# Patient Record
Sex: FEMALE | Race: WHITE | NOT HISPANIC OR LATINO | URBAN - METROPOLITAN AREA
[De-identification: names, ages, dates, MRNs, and addresses within clinical notes are randomized per-mention and may not be internally consistent; named-entity substitution may affect disease eponyms.]

---

## 2017-05-19 ENCOUNTER — CONVERSION ENCOUNTER (OUTPATIENT)
Dept: RHEUMATOLOGY | Facility: CLINIC | Age: 62
End: 2017-05-19

## 2017-05-19 LAB
ALBUMIN SERPL-MCNC: 4 G/DL (ref 3.6–5.1)
ALBUMIN/GLOB SERPL: ABNORMAL {RATIO} (ref 1–2.5)
ALP SERPL-CCNC: 51 UNITS/L (ref 33–130)
ALT SERPL-CCNC: 20 UNITS/L (ref 6–29)
AST SERPL-CCNC: 22 UNITS/L (ref 10–35)
BASOPHILS # BLD AUTO: ABNORMAL 10*3/MM3 (ref 0–200)
BASOPHILS NFR BLD AUTO: 1.5 %
BILIRUB SERPL-MCNC: 0.5 MG/DL (ref 0.2–1.2)
BUN SERPL-MCNC: 19 MG/DL (ref 7–25)
BUN/CREAT SERPL: ABNORMAL (ref 6–22)
CALCIUM SERPL-MCNC: 10.2 MG/DL (ref 8.6–10.4)
CHLORIDE SERPL-SCNC: 98 MMOL/L (ref 98–110)
CO2 CONTENT VENOUS: 28 MMOL/L (ref 20–31)
CONV NEUTROPHILS/100 LEUKOCYTES IN BODY FLUID BY MANUAL COUNT: 43.5 %
CONV TOTAL PROTEIN: 6.5 G/DL (ref 6.1–8.1)
CREAT UR-MCNC: 1.4 MG/DL (ref 0.5–0.99)
CRP SERPL-MCNC: ABNORMAL MG/DL
EOSINOPHIL # BLD AUTO: 6.1 %
EOSINOPHIL # BLD AUTO: ABNORMAL 10*3/MM3 (ref 15–500)
ERYTHROCYTE [DISTWIDTH] IN BLOOD BY AUTOMATED COUNT: 11.7 % (ref 11–15)
ERYTHROCYTE [SEDIMENTATION RATE] IN BLOOD BY WESTERGREN METHOD: 6 MM/HR
GLOBULIN UR ELPH-MCNC: ABNORMAL G/DL (ref 1.9–3.7)
GLUCOSE SERPL-MCNC: 219 MG/DL (ref 65–99)
HCT VFR BLD AUTO: 39.3 % (ref 35–45)
HGB BLD-MCNC: 13 G/DL (ref 11.7–15.5)
LYMPHOCYTES # BLD AUTO: ABNORMAL 10*3/MM3 (ref 850–3900)
LYMPHOCYTES NFR BLD AUTO: 37.7 %
MCH RBC QN AUTO: 30.4 PG (ref 27–33)
MCHC RBC AUTO-ENTMCNC: ABNORMAL % (ref 32–36)
MCV RBC AUTO: 91.8 FL (ref 80–100)
MONOCYTES # BLD AUTO: ABNORMAL 10*3/MICROLITER (ref 200–950)
MONOCYTES NFR BLD AUTO: 11.2 %
NEUTROPHILS # BLD AUTO: ABNORMAL 10*3/MM3 (ref 1500–7800)
PLATELET # BLD AUTO: ABNORMAL 10*3/MM3 (ref 140–400)
PMV BLD AUTO: 8.6 FL (ref 7.5–12.5)
POTASSIUM SERPL-SCNC: 4.4 MMOL/L (ref 3.5–5.3)
RBC # BLD AUTO: ABNORMAL 10*6/MM3 (ref 3.8–5.1)
SODIUM SERPL-SCNC: 134 MMOL/L (ref 135–146)
WBC # BLD AUTO: ABNORMAL K/UL (ref 3.8–10.8)

## 2017-06-28 ENCOUNTER — HOSPITAL ENCOUNTER (OUTPATIENT)
Dept: MAMMOGRAPHY | Facility: HOSPITAL | Age: 62
Discharge: HOME | End: 2017-06-28
Attending: NURSE PRACTITIONER | Admitting: NURSE PRACTITIONER

## 2017-08-09 ENCOUNTER — HOSPITAL ENCOUNTER (OUTPATIENT)
Dept: OTHER | Facility: HOSPITAL | Age: 62
Discharge: HOME | End: 2017-08-09
Attending: NURSE PRACTITIONER | Admitting: NURSE PRACTITIONER

## 2017-08-09 LAB
ALBUMIN SERPL-MCNC: 3.8 G/DL (ref 3.5–4.8)
ALBUMIN/GLOB SERPL: 1.5 {RATIO} (ref 1–1.7)
ALP SERPL-CCNC: 49 IU/L (ref 32–91)
ALT SERPL-CCNC: 28 IU/L (ref 14–54)
ANION GAP SERPL CALC-SCNC: 13.4 MMOL/L (ref 10–20)
AST SERPL-CCNC: 29 IU/L (ref 15–41)
BASOPHILS # BLD AUTO: 0 10*3/UL (ref 0–0.2)
BASOPHILS NFR BLD AUTO: 1 % (ref 0–2)
BILIRUB SERPL-MCNC: 0.7 MG/DL (ref 0.3–1.2)
BUN SERPL-MCNC: 19 MG/DL (ref 8–20)
BUN/CREAT SERPL: 14.6 (ref 5.4–26.2)
CALCIUM SERPL-MCNC: 9.9 MG/DL (ref 8.9–10.3)
CHLORIDE SERPL-SCNC: 95 MMOL/L (ref 101–111)
CONV ABS BANDS: 0.1 10*3/UL
CONV CO2: 25 MMOL/L (ref 22–32)
CONV TOTAL PROTEIN: 6.4 G/DL (ref 6.1–7.9)
CREAT UR-MCNC: 1.3 MG/DL (ref 0.4–1)
CRP SERPL-MCNC: 0.05 MG/DL (ref 0–0.7)
DIFFERENTIAL METHOD BLD: (no result)
EOSINOPHIL # BLD AUTO: 0.1 10*3/UL (ref 0–0.3)
EOSINOPHIL # BLD AUTO: 4 % (ref 0–3)
ERYTHROCYTE [DISTWIDTH] IN BLOOD BY AUTOMATED COUNT: 12.4 % (ref 11.5–14.5)
ERYTHROCYTE [SEDIMENTATION RATE] IN BLOOD BY WESTERGREN METHOD: 16 MM/HR (ref 0–30)
GLOBULIN UR ELPH-MCNC: 2.6 G/DL (ref 2.5–3.8)
GLUCOSE SERPL-MCNC: 244 MG/DL (ref 65–99)
HCT VFR BLD AUTO: 38.3 % (ref 35–49)
HGB BLD-MCNC: 13 G/DL (ref 12–15)
LYMPHOCYTES # BLD AUTO: 1.8 10*3/UL (ref 0.8–4.8)
LYMPHOCYTES NFR BLD AUTO: 45 % (ref 18–42)
MCH RBC QN AUTO: 30.8 PG (ref 26–32)
MCHC RBC AUTO-ENTMCNC: 33.9 G/DL (ref 32–36)
MCV RBC AUTO: 91.1 FL (ref 80–94)
MONOCYTES # BLD AUTO: 0.1 10*3/UL (ref 0.1–1.3)
MONOCYTES NFR BLD AUTO: 3 % (ref 2–11)
NEUTROPHILS # BLD AUTO: 1.5 10*3/UL (ref 2.3–8.6)
NEUTROPHILS NFR BLD AUTO: 43 % (ref 50–75)
NEUTS BAND NFR BLD MANUAL: 4 % (ref 0–5)
PLATELET # BLD AUTO: 242 10*3/UL (ref 150–450)
PMV BLD AUTO: 7 FL (ref 7.4–10.4)
POTASSIUM SERPL-SCNC: 4.4 MMOL/L (ref 3.6–5.1)
RBC # BLD AUTO: 4.21 10*6/UL (ref 4–5.4)
SODIUM SERPL-SCNC: 129 MMOL/L (ref 136–144)
WBC # BLD AUTO: 3.6 10*3/UL (ref 4.5–11.5)

## 2017-08-16 ENCOUNTER — HOSPITAL ENCOUNTER (OUTPATIENT)
Dept: MAMMOGRAPHY | Facility: HOSPITAL | Age: 62
Discharge: HOME | End: 2017-08-16
Attending: NURSE PRACTITIONER | Admitting: NURSE PRACTITIONER

## 2017-08-18 LAB
ALBUMIN SERPL-MCNC: 3.9 G/DL (ref 3.6–5.1)
ALBUMIN/GLOB SERPL: ABNORMAL {RATIO} (ref 1–2.5)
ALP SERPL-CCNC: 59 UNITS/L (ref 33–130)
ALT SERPL-CCNC: 21 UNITS/L (ref 6–29)
AST SERPL-CCNC: 19 UNITS/L (ref 10–35)
BASOPHILS # BLD AUTO: ABNORMAL 10*3/MM3 (ref 0–200)
BASOPHILS NFR BLD AUTO: 1.9 %
BILIRUB SERPL-MCNC: 0.4 MG/DL (ref 0.2–1.2)
BUN SERPL-MCNC: 27 MG/DL (ref 7–25)
BUN/CREAT SERPL: ABNORMAL (ref 6–22)
C3 SERPL-MCNC: 82 MG/DL (ref 90–180)
C4 SERPL-MCNC: 22 MG/DL (ref 16–47)
CALCIUM SERPL-MCNC: 10 MG/DL (ref 8.6–10.4)
CHLORIDE SERPL-SCNC: 97 MMOL/L (ref 98–110)
CO2 CONTENT VENOUS: 25 MMOL/L (ref 20–31)
CONV NEUTROPHILS/100 LEUKOCYTES IN BODY FLUID BY MANUAL COUNT: 36.9 %
CONV TOTAL PROTEIN: 6.5 G/DL (ref 6.1–8.1)
CREAT UR-MCNC: 1.49 MG/DL (ref 0.5–0.99)
CRP SERPL-MCNC: ABNORMAL MG/DL
EOSINOPHIL # BLD AUTO: 5.6 %
EOSINOPHIL # BLD AUTO: ABNORMAL 10*3/MM3 (ref 15–500)
ERYTHROCYTE [DISTWIDTH] IN BLOOD BY AUTOMATED COUNT: 11.7 % (ref 11–15)
GLOBULIN UR ELPH-MCNC: ABNORMAL G/DL (ref 1.9–3.7)
GLUCOSE SERPL-MCNC: 349 MG/DL (ref 65–99)
HCT VFR BLD AUTO: 37.4 % (ref 35–45)
HGB BLD-MCNC: 12.7 G/DL (ref 11.7–15.5)
LYMPHOCYTES # BLD AUTO: ABNORMAL 10*3/MM3 (ref 850–3900)
LYMPHOCYTES NFR BLD AUTO: 41 %
MCH RBC QN AUTO: 31.3 PG (ref 27–33)
MCHC RBC AUTO-ENTMCNC: ABNORMAL % (ref 32–36)
MCV RBC AUTO: 92.1 FL (ref 80–100)
MONOCYTES # BLD AUTO: ABNORMAL 10*3/MICROLITER (ref 200–950)
MONOCYTES NFR BLD AUTO: 14.6 %
NEUTROPHILS # BLD AUTO: ABNORMAL 10*3/MM3 (ref 1500–7800)
PLATELET # BLD AUTO: ABNORMAL 10*3/MM3 (ref 140–400)
PMV BLD AUTO: 8.9 FL (ref 7.5–12.5)
POTASSIUM SERPL-SCNC: 4.9 MMOL/L (ref 3.5–5.3)
RBC # BLD AUTO: ABNORMAL 10*6/MM3 (ref 3.8–5.1)
SODIUM SERPL-SCNC: 132 MMOL/L (ref 135–146)
WBC # BLD AUTO: ABNORMAL K/UL (ref 3.8–10.8)

## 2017-12-09 LAB
ALBUMIN SERPL-MCNC: 3.9 G/DL (ref 3.6–5.1)
ALBUMIN/GLOB SERPL: ABNORMAL {RATIO} (ref 1–2.5)
ALP SERPL-CCNC: 63 UNITS/L (ref 33–130)
ALT SERPL-CCNC: 30 UNITS/L (ref 6–29)
AST SERPL-CCNC: 29 UNITS/L (ref 10–35)
BILIRUB SERPL-MCNC: 0.4 MG/DL (ref 0.2–1.2)
BILIRUB UR QL STRIP: NEGATIVE
BUN SERPL-MCNC: 21 MG/DL (ref 7–25)
BUN/CREAT SERPL: ABNORMAL (ref 6–22)
C3 SERPL-MCNC: 100 MG/DL (ref 90–180)
C4 SERPL-MCNC: 27 MG/DL (ref 16–47)
CALCIUM SERPL-MCNC: 9.9 MG/DL (ref 8.6–10.4)
CHLORIDE SERPL-SCNC: 99 MMOL/L (ref 98–110)
CO2 CONTENT VENOUS: 28 MMOL/L (ref 20–31)
COLOR UR: YELLOW
CONV BACTERIA IN URINE MICRO: ABNORMAL /HPF
CONV HYALINE CASTS IN URINE MICRO: ABNORMAL
CONV PROTEIN IN URINE BY AUTOMATED TEST STRIP: ABNORMAL
CONV TOTAL PROTEIN: 6.7 G/DL (ref 6.1–8.1)
CREAT UR-MCNC: 1.43 MG/DL (ref 0.5–0.99)
ERYTHROCYTE [DISTWIDTH] IN BLOOD BY AUTOMATED COUNT: 11.8 % (ref 11–15)
ERYTHROCYTE [SEDIMENTATION RATE] IN BLOOD BY WESTERGREN METHOD: 6 MM/HR
GLOBULIN UR ELPH-MCNC: ABNORMAL G/DL (ref 1.9–3.7)
GLUCOSE SERPL-MCNC: 216 MG/DL (ref 65–99)
GLUCOSE UR QL: ABNORMAL G/DL
HCT VFR BLD AUTO: 38.6 % (ref 35–45)
HGB BLD-MCNC: 13 G/DL (ref 11.7–15.5)
HGB UR QL STRIP: NEGATIVE
KETONES UR QL STRIP: NEGATIVE
LEUKOCYTE ESTERASE UR QL STRIP: NEGATIVE
MCH RBC QN AUTO: 30.4 PG (ref 27–33)
MCHC RBC AUTO-ENTMCNC: ABNORMAL % (ref 32–36)
MCV RBC AUTO: 90.4 FL (ref 80–100)
NITRITE UR QL STRIP: NEGATIVE
PH UR STRIP.AUTO: 5.5 [PH] (ref 5–8)
PLATELET # BLD AUTO: ABNORMAL 10*3/MM3 (ref 140–400)
PMV BLD AUTO: 9.7 FL (ref 7.5–12.5)
POTASSIUM SERPL-SCNC: 4.5 MMOL/L (ref 3.5–5.3)
RBC # BLD AUTO: ABNORMAL 10*6/MM3 (ref 3.8–5.1)
RBC #/AREA URNS HPF: ABNORMAL /[HPF]
SODIUM SERPL-SCNC: 133 MMOL/L (ref 135–146)
SP GR UR: 1.01 (ref 1–1.03)
SQUAMOUS #/AREA URNS HPF: ABNORMAL /HPF
TSH SERPL-ACNC: 0.77 MICROINTL UNITS/ML (ref 0.4–4.5)
WBC # BLD AUTO: ABNORMAL K/UL (ref 3.8–10.8)
WBC #/AREA URNS HPF: ABNORMAL CELLS/HPF

## 2020-12-22 ENCOUNTER — OFFICE VISIT (OUTPATIENT)
Dept: ENDOCRINOLOGY | Facility: CLINIC | Age: 65
End: 2020-12-22

## 2020-12-22 ENCOUNTER — TELEPHONE (OUTPATIENT)
Dept: ENDOCRINOLOGY | Facility: CLINIC | Age: 65
End: 2020-12-22

## 2020-12-22 VITALS
SYSTOLIC BLOOD PRESSURE: 170 MMHG | OXYGEN SATURATION: 97 % | DIASTOLIC BLOOD PRESSURE: 80 MMHG | HEART RATE: 76 BPM | TEMPERATURE: 97.7 F | BODY MASS INDEX: 28.52 KG/M2 | HEIGHT: 62 IN | WEIGHT: 155 LBS

## 2020-12-22 DIAGNOSIS — E78.2 MIXED HYPERLIPIDEMIA: ICD-10-CM

## 2020-12-22 DIAGNOSIS — I10 ESSENTIAL HYPERTENSION: ICD-10-CM

## 2020-12-22 DIAGNOSIS — E10.65 TYPE 1 DIABETES MELLITUS WITH HYPERGLYCEMIA (HCC): Primary | ICD-10-CM

## 2020-12-22 PROBLEM — M54.50 LOWER BACK PAIN: Status: ACTIVE | Noted: 2017-12-07

## 2020-12-22 PROBLEM — M25.551 PAIN OF RIGHT HIP JOINT: Status: ACTIVE | Noted: 2017-02-13

## 2020-12-22 PROBLEM — N18.2 STAGE 2 CHRONIC KIDNEY DISEASE: Status: ACTIVE | Noted: 2017-02-13

## 2020-12-22 PROBLEM — M85.80 OSTEOPENIA: Status: ACTIVE | Noted: 2017-08-16

## 2020-12-22 LAB — GLUCOSE BLDC GLUCOMTR-MCNC: 194 MG/DL (ref 70–105)

## 2020-12-22 PROCEDURE — 82962 GLUCOSE BLOOD TEST: CPT | Performed by: INTERNAL MEDICINE

## 2020-12-22 PROCEDURE — 99204 OFFICE O/P NEW MOD 45 MIN: CPT | Performed by: INTERNAL MEDICINE

## 2020-12-22 RX ORDER — INSULIN DETEMIR 100 [IU]/ML
26 INJECTION, SOLUTION SUBCUTANEOUS DAILY
COMMUNITY
Start: 2015-08-21 | End: 2020-12-22

## 2020-12-22 RX ORDER — MAG HYDROX/ALUMINUM HYD/SIMETH 400-400-40
SUSPENSION, ORAL (FINAL DOSE FORM) ORAL
COMMUNITY
Start: 2015-11-18 | End: 2022-04-28

## 2020-12-22 RX ORDER — LEVOTHYROXINE SODIUM 0.15 MG/1
TABLET ORAL DAILY
COMMUNITY
Start: 2015-08-21 | End: 2021-10-26 | Stop reason: SDUPTHER

## 2020-12-22 RX ORDER — OMEPRAZOLE 40 MG/1
40 CAPSULE, DELAYED RELEASE ORAL DAILY
COMMUNITY
Start: 2016-08-18 | End: 2021-10-26

## 2020-12-22 RX ORDER — AMLODIPINE BESYLATE 5 MG/1
TABLET ORAL
COMMUNITY
Start: 2020-10-25 | End: 2022-04-28 | Stop reason: SDUPTHER

## 2020-12-22 RX ORDER — INSULIN LISPRO 100 [IU]/ML
INJECTION, SOLUTION INTRAVENOUS; SUBCUTANEOUS
Qty: 5 PEN | Refills: 6 | Status: SHIPPED | OUTPATIENT
Start: 2020-12-22 | End: 2021-12-30

## 2020-12-22 RX ORDER — CHOLECALCIFEROL (VITAMIN D3) 25 MCG
2000 CAPSULE ORAL 2 TIMES DAILY
COMMUNITY
Start: 2015-11-18 | End: 2022-08-22 | Stop reason: SDUPTHER

## 2020-12-22 RX ORDER — LISINOPRIL 10 MG/1
TABLET ORAL
COMMUNITY
Start: 2015-08-21 | End: 2020-12-22

## 2020-12-22 RX ORDER — GABAPENTIN 100 MG/1
1 CAPSULE ORAL EVERY 8 HOURS
COMMUNITY
Start: 2017-12-07 | End: 2020-12-22

## 2020-12-22 RX ORDER — PRENATAL VIT NO.126/IRON/FOLIC 28MG-0.8MG
2 TABLET ORAL DAILY
COMMUNITY

## 2020-12-22 RX ORDER — ONDANSETRON 4 MG/1
4 TABLET, FILM COATED ORAL EVERY 8 HOURS PRN
COMMUNITY

## 2020-12-22 RX ORDER — LOSARTAN POTASSIUM 25 MG/1
12.5 TABLET ORAL DAILY
COMMUNITY
Start: 2020-07-29 | End: 2020-12-22

## 2020-12-22 RX ORDER — RANITIDINE 150 MG/1
TABLET ORAL
COMMUNITY
Start: 2015-11-18 | End: 2020-12-22

## 2020-12-22 RX ORDER — LANOLIN ALCOHOL/MO/W.PET/CERES
50 CREAM (GRAM) TOPICAL DAILY
COMMUNITY
End: 2022-08-22

## 2020-12-22 RX ORDER — CALCITRIOL 0.25 UG/1
CAPSULE, LIQUID FILLED ORAL
COMMUNITY
Start: 2016-04-13 | End: 2022-09-13

## 2020-12-22 RX ORDER — ASPIRIN 81 MG/1
81 TABLET ORAL DAILY
COMMUNITY

## 2020-12-22 RX ORDER — THIAMINE HCL 50 MG
50 TABLET ORAL DAILY
COMMUNITY
End: 2022-04-28

## 2020-12-22 RX ORDER — HYDROXYCHLOROQUINE SULFATE 200 MG/1
200 TABLET, FILM COATED ORAL 2 TIMES DAILY
COMMUNITY
Start: 2018-01-04

## 2020-12-22 RX ORDER — INSULIN DETEMIR 100 [IU]/ML
INJECTION, SOLUTION SUBCUTANEOUS
Qty: 5 PEN | Refills: 6 | Status: SHIPPED | OUTPATIENT
Start: 2020-12-22 | End: 2021-06-22

## 2020-12-22 RX ORDER — LANCETS
EACH MISCELLANEOUS
COMMUNITY
Start: 2016-04-13

## 2020-12-22 RX ORDER — KETOROLAC TROMETHAMINE 4 MG/ML
SOLUTION/ DROPS OPHTHALMIC
COMMUNITY
End: 2020-12-22

## 2020-12-22 RX ORDER — CIPROFLOXACIN 500 MG/1
TABLET, FILM COATED ORAL EVERY 12 HOURS
COMMUNITY
Start: 2017-09-28 | End: 2020-12-22

## 2020-12-22 RX ORDER — LEFLUNOMIDE 10 MG/1
TABLET ORAL
COMMUNITY
Start: 2016-08-18 | End: 2020-12-22

## 2020-12-22 RX ORDER — INSULIN LISPRO 100 [IU]/ML
2 INJECTION, SOLUTION INTRAVENOUS; SUBCUTANEOUS
COMMUNITY
Start: 2015-08-21 | End: 2020-12-22

## 2020-12-22 RX ORDER — ATORVASTATIN CALCIUM 40 MG/1
40 TABLET, FILM COATED ORAL DAILY
COMMUNITY
Start: 2017-07-19 | End: 2021-10-26

## 2020-12-22 RX ORDER — BUMETANIDE 1 MG/1
1 TABLET ORAL 2 TIMES DAILY
COMMUNITY
End: 2022-11-03

## 2020-12-22 RX ORDER — BLOOD-GLUCOSE METER
EACH MISCELLANEOUS
COMMUNITY
Start: 2016-04-13

## 2020-12-22 RX ORDER — SENNOSIDES 8.6 MG
650 CAPSULE ORAL DAILY PRN
COMMUNITY

## 2020-12-22 RX ORDER — CETIRIZINE HYDROCHLORIDE 10 MG/1
TABLET ORAL
COMMUNITY
Start: 2015-11-18 | End: 2022-08-22 | Stop reason: SDUPTHER

## 2020-12-22 RX ORDER — VIT C/B6/B5/MAGNESIUM/HERB 173 50-5-6-5MG
1 CAPSULE ORAL DAILY
COMMUNITY

## 2020-12-22 RX ORDER — PYRIDOXINE HCL (VITAMIN B6) 100 MG
1 TABLET ORAL DAILY
COMMUNITY
Start: 2017-06-27

## 2020-12-22 RX ORDER — TRAMADOL HYDROCHLORIDE 50 MG/1
TABLET ORAL
COMMUNITY
Start: 2015-08-21 | End: 2020-12-22

## 2020-12-22 NOTE — PROGRESS NOTES
Endocrine Consult Outpatient  Self-referred  Patient Care Team:  Mary Anne Salazar APRN as PCP - General     Chief Complaint: Uncontrolled type 1 diabetes    HPI: 65-year-old female with diagnosis of type 1 diabetes, hypertension, hyperlipidemia and hypothyroidism is here for evaluation.  For type 1 diabetes: She is currently on Levemir 26 units daily in the morning and Humalog sliding scale.  She used to be on insulin pump however is not using it and would like to go back on insulin pump.  She did bring in blood sugar records for review and they are running between .  Hypertension: On amlodipine, she developed cough with lisinopril.  Hyperlipidemia: Continues to atorvastatin  Hypothyroidism: On replacement with levothyroxine.    Old records reviewed: Labs from December 18, 2020 showed a urine microalbumin creatinine ratio was 91, C-peptide was less than 1.1 and glucose of 147.    Past Medical History:   Diagnosis Date   • Arthritis    • Diabetes mellitus type I (CMS/[facility])    • Hyperlipidemia    • Hypertension    • Hyperthyroidism    • Kidney disease    • Neuropathy        Social History     Socioeconomic History   • Marital status: Single     Spouse name: Not on file   • Number of children: Not on file   • Years of education: Not on file   • Highest education level: Not on file   Tobacco Use   • Smoking status: Former Smoker   • Smokeless tobacco: Never Used   Substance and Sexual Activity   • Alcohol use: Never     Frequency: Never   • Drug use: Defer   • Sexual activity: Defer       Family History   Problem Relation Age of Onset   • Diabetes Father    • Diabetes Maternal Grandmother        Allergies   Allergen Reactions   • Azithromycin GI Intolerance   • Erythromycin GI Intolerance       ROS:   Constitutional:  Admit fatigue, tiredness.    Eyes:  Denies change in visual acuity   HENT:  Denies nasal congestion or sore throat   Respiratory: denies cough, shortness of breath.   Cardiovascular:  denies  chest pain, edema   GI:  Denies abdominal pain, nausea, vomiting.    :  Denies dysuria   Musculoskeletal:  Denies back pain or joint pain   Integument:  Denies dry skin, rash   Neurologic:  Denies headache, focal weakness or sensory changes   Endocrine:  Denies polyuria or polydipsia   Psychiatric:  Denies depression or anxiety      Vitals:    12/22/20 1351   BP: 170/80   Pulse: 76   Temp: 97.7 °F (36.5 °C)   SpO2: 97%        Physical Exam:  GEN: NAD, conversant  EYES: EOMI, PERRL, no conjunctival erythema  NECK: no thyromegaly, full ROM   CV: RRR, no murmurs/rubs/gallops, no peripheral edema  LUNG: CTAB, no wheezes/rales/ronchi  SKIN: no rashes, no acanthosis  MSK: Has BL LE edema, full ROM of all extremities  NEURO: no tremors, DTR normal  PSYCH: AOX3, appropriate mood, affect normal      Results Review:     I reviewed the patient's new clinical results.    Lab Results   Component Value Date    GLUCOSE 216 (H) 12/09/2017    BUN 21 12/09/2017    CREATININE 1.43 (H) 12/09/2017    EGFRIFNONA 45 (L) 12/09/2017    EGFRIFAFRI 39 (L) 12/09/2017    BCR 15 (calc) 12/09/2017    K 4.5 12/09/2017    CO2 25 08/09/2017    CALCIUM 9.9 12/09/2017    ALBUMIN 3.9 12/09/2017    LABIL2 1.4 (calc) 12/09/2017    AST 29 12/09/2017    ALT 30 (H) 12/09/2017     No results found for: HGBA1C  Lab Results   Component Value Date    CREATININE 1.43 (H) 12/09/2017     Lab Results   Component Value Date    TSH 0.77 12/09/2017       Medication Review: Reviewed.       Current Outpatient Medications:   •  acetaminophen (TYLENOL) 650 MG 8 hr tablet, Take 650 mg by mouth., Disp: , Rfl:   •  amLODIPine (NORVASC) 5 MG tablet, TAKE ONE TABLET BY MOUTH DAILY, Disp: , Rfl:   •  aspirin (aspirin) 81 MG EC tablet, Take 81 mg by mouth Daily., Disp: , Rfl:   •  atorvastatin (LIPITOR) 40 MG tablet, Take 40 mg by mouth Daily., Disp: , Rfl:   •  B Complex-Biotin-FA (B Complete) tablet, B COMPLETE TABS, Disp: , Rfl:   •  Blood Glucose Monitoring Suppl (ONE  TOUCH ULTRA 2) w/Device kit, ONETOUCH ULTRA 2 w/Device KIT, Disp: , Rfl:   •  bumetanide (Bumex) 0.5 MG tablet, Take 0.5 mg by mouth Daily., Disp: , Rfl:   •  calcitriol (ROCALTROL) 0.25 MCG capsule, CALCITRIOL 0.25 MCG CAPS, Disp: , Rfl:   •  cetirizine (ZyrTEC Allergy) 10 MG tablet, ZYRTEC ALLERGY 10 MG TABS, Disp: , Rfl:   •  Cholecalciferol (Vitamin D-3) 25 MCG (1000 UT) capsule, Take 2,000 Units by mouth 2 (two) times a day., Disp: , Rfl:   •  ELDERBERRY PO, Take  by mouth., Disp: , Rfl:   •  Ferrous Sulfate (IRON PO), IRON TABS, Disp: , Rfl:   •  glucose blood (ONE TOUCH ULTRA TEST) test strip, ONETOUCH ULTRA BLUE STRP, Disp: , Rfl:   •  hydroxychloroquine (PLAQUENIL) 200 MG tablet, Take 200 mg by mouth 2 (Two) Times a Day., Disp: , Rfl:   •  insulin detemir (Levemir) 100 UNIT/ML injection, Inject 26 Units under the skin into the appropriate area as directed Daily., Disp: , Rfl:   •  Insulin Lispro (HumaLOG) 100 UNIT/ML solution cartridge, 2 Units 3 (Three) Times a Day With Meals. 2 units three times a daily with meals also added Slinding scale, Disp: , Rfl:   •  Lancets (onetouch ultrasoft) lancets, ONETOUCH ULTRASOFT LANCETS, Disp: , Rfl:   •  levothyroxine (SYNTHROID, LEVOTHROID) 150 MCG tablet, Take  by mouth Daily., Disp: , Rfl:   •  Multiple Vitamins-Minerals (OCUVITE ADULT 50+ PO), Take  by mouth., Disp: , Rfl:   •  Omega-3 Krill Oil 300 MG capsule, OMEGA-3 KRILL  MG CAPS, Disp: , Rfl:   •  omeprazole (priLOSEC) 40 MG capsule, Take 40 mg by mouth Daily. Alternate days then 20 on the other days, Disp: , Rfl:   •  ondansetron (ZOFRAN) 4 MG tablet, Take 4 mg by mouth Every 8 (Eight) Hours As Needed for Nausea or Vomiting., Disp: , Rfl:   •  prenatal vitamin (prenatal, CLASSIC, vitamin) tablet, Take  by mouth Daily., Disp: , Rfl:   •  Thiamine HCl (vitamin B-1) 50 MG tablet, Take 50 mg by mouth Daily., Disp: , Rfl:   •  Turmeric 500 MG capsule, Take  by mouth., Disp: , Rfl:   •  vitamin B-6  (PYRIDOXINE) 50 MG tablet, Take 50 mg by mouth Daily., Disp: , Rfl:     Assessment/Plan   1.  Diabetes mellitus type 1: Uncontrolled with significant fluctuations in the blood sugars and low blood sugars, at this time I will ask her to reduce Levemir to 20 units subcu daily and change Humalog to 6 units with each meal along with a correction scale of 1 unit for each 50/150.  I will refer her for diabetes education for diabetes insulin pump along with a continuous glucose monitoring system.  In the meantime of advised her to always keep glucose source in case of low blood sugar and get annual eye exam and flu vaccine.     2.  Hypertension: Blood pressure is high today  It is very high today going on.  She does have chronic kidney disease, I will check CMP before adding any more medications.    3.  Hyperlipidemia: Will follow FLP.                   Jo Mahoney MD FACE.

## 2020-12-22 NOTE — PATIENT INSTRUCTIONS
Change Levemir to 20 units subcu daily  Change Humalog to 6 units with each meal along with a sliding scale of 1 unit for each 50 mg blood sugar over 150  Please get labs done fasting in the next few days  Arrange for diabetes education consultation for insulin pump and glucose monitoring system  Annual eye exam and flu vaccine

## 2021-01-12 ENCOUNTER — OFFICE VISIT (OUTPATIENT)
Dept: ENDOCRINOLOGY | Facility: CLINIC | Age: 66
End: 2021-01-12

## 2021-01-12 DIAGNOSIS — E10.65 TYPE 1 DIABETES MELLITUS WITH HYPERGLYCEMIA (HCC): ICD-10-CM

## 2021-01-12 PROCEDURE — G0108 DIAB MANAGE TRN  PER INDIV: HCPCS | Performed by: DIETITIAN, REGISTERED

## 2021-01-12 NOTE — PROGRESS NOTES
Spent 1 hour with pt. Pt was referred to diabetes education to talk about choosing a new insulin pump and CGM. Pt was unable to decide on pump and CGM at this time, so provided handouts for pt to review at home. Advised pt to call when she has made a decision. Pt also indicated interest in going through review DM education. Pt had attended DSME classes in the past, so signed pt up for refresher class 1 on 2/2/21.

## 2021-01-13 ENCOUNTER — TELEPHONE (OUTPATIENT)
Dept: ENDOCRINOLOGY | Facility: CLINIC | Age: 66
End: 2021-01-13

## 2021-01-13 NOTE — TELEPHONE ENCOUNTER
BGs scanned into phone note. Per MD s/o pt to take 10 units of levemir in the morning and 10 units at bedtime. Pt reported she is currently only taking 3 units of humalog with meals. Pt is to drop off BG's next week.

## 2021-01-21 ENCOUNTER — TELEPHONE (OUTPATIENT)
Dept: ENDOCRINOLOGY | Facility: CLINIC | Age: 66
End: 2021-01-21

## 2021-02-02 ENCOUNTER — TELEPHONE (OUTPATIENT)
Dept: ENDOCRINOLOGY | Facility: CLINIC | Age: 66
End: 2021-02-02

## 2021-02-02 ENCOUNTER — OFFICE VISIT (OUTPATIENT)
Dept: ENDOCRINOLOGY | Facility: CLINIC | Age: 66
End: 2021-02-02

## 2021-02-02 DIAGNOSIS — E10.65 TYPE 1 DIABETES MELLITUS WITH HYPERGLYCEMIA (HCC): ICD-10-CM

## 2021-02-02 PROCEDURE — G0109 DIAB MANAGE TRN IND/GROUP: HCPCS | Performed by: DIETITIAN, REGISTERED

## 2021-02-02 NOTE — TELEPHONE ENCOUNTER
Pt brought in recent BGs, scanned into phone note. Attempted to call pt, but had to leave  to call us back. Want to discuss increasing her humalog 1 unit. Pt was previously apprehensive about it d/t being afraid of going low.     Pt also still deciding on which insulin pump she would like to choose.

## 2021-02-03 NOTE — TELEPHONE ENCOUNTER
Per MD s/o, pt is to increase to 4 units of humalog with meals in addition to her SSI of 1 unit for every 50 mg over 150. Pt is to send in BGs next week.

## 2021-02-10 ENCOUNTER — TELEPHONE (OUTPATIENT)
Dept: ENDOCRINOLOGY | Facility: CLINIC | Age: 66
End: 2021-02-10

## 2021-02-10 NOTE — TELEPHONE ENCOUNTER
BGs scanned into phone note. Had to leave VM to call us back. Was going to ask pt if she knew what had caused her low BG in the past week, and tell her to subtract 1 unit of Humalog for a BG < 150.

## 2021-02-11 NOTE — TELEPHONE ENCOUNTER
Pt called back and does not remember about her low BG last week.  Gave pt George's recommendation to reduce her H ac by one unit if pre-meal BG is <150.  Pt verbalized an understanding.

## 2021-02-22 ENCOUNTER — OFFICE VISIT (OUTPATIENT)
Dept: ENDOCRINOLOGY | Facility: CLINIC | Age: 66
End: 2021-02-22

## 2021-02-22 DIAGNOSIS — E10.65 TYPE 1 DIABETES MELLITUS WITH HYPERGLYCEMIA (HCC): ICD-10-CM

## 2021-02-22 PROCEDURE — G0109 DIAB MANAGE TRN IND/GROUP: HCPCS | Performed by: DIETITIAN, REGISTERED

## 2021-04-20 ENCOUNTER — TELEPHONE (OUTPATIENT)
Dept: ENDOCRINOLOGY | Facility: CLINIC | Age: 66
End: 2021-04-20

## 2021-04-20 RX ORDER — LOSARTAN POTASSIUM 25 MG/1
25 TABLET ORAL DAILY
COMMUNITY
Start: 2021-02-19 | End: 2021-04-27 | Stop reason: SDUPTHER

## 2021-04-20 RX ORDER — OMEPRAZOLE 20 MG/1
20 CAPSULE, DELAYED RELEASE ORAL
COMMUNITY
End: 2021-10-26

## 2021-04-27 ENCOUNTER — OFFICE VISIT (OUTPATIENT)
Dept: ENDOCRINOLOGY | Facility: CLINIC | Age: 66
End: 2021-04-27

## 2021-04-27 VITALS
HEART RATE: 75 BPM | OXYGEN SATURATION: 98 % | WEIGHT: 152 LBS | BODY MASS INDEX: 27.97 KG/M2 | HEIGHT: 62 IN | TEMPERATURE: 97.8 F | SYSTOLIC BLOOD PRESSURE: 165 MMHG | DIASTOLIC BLOOD PRESSURE: 75 MMHG

## 2021-04-27 DIAGNOSIS — E10.21 TYPE 1 DIABETES MELLITUS WITH DIABETIC NEPHROPATHY (HCC): Primary | ICD-10-CM

## 2021-04-27 DIAGNOSIS — I10 ESSENTIAL HYPERTENSION: ICD-10-CM

## 2021-04-27 DIAGNOSIS — E03.9 ACQUIRED HYPOTHYROIDISM: ICD-10-CM

## 2021-04-27 DIAGNOSIS — N18.32 STAGE 3B CHRONIC KIDNEY DISEASE (HCC): ICD-10-CM

## 2021-04-27 DIAGNOSIS — E11.42 DIABETIC PERIPHERAL NEUROPATHY (HCC): ICD-10-CM

## 2021-04-27 DIAGNOSIS — E78.2 MIXED HYPERLIPIDEMIA: ICD-10-CM

## 2021-04-27 PROBLEM — M32.9 LUPUS: Status: ACTIVE | Noted: 2021-04-27

## 2021-04-27 PROBLEM — N28.9 NEPHROPATHY: Status: ACTIVE | Noted: 2021-04-27

## 2021-04-27 PROBLEM — IMO0002 LUPUS: Status: ACTIVE | Noted: 2021-04-27

## 2021-04-27 PROBLEM — E78.5 DYSLIPIDEMIA: Status: ACTIVE | Noted: 2021-04-27

## 2021-04-27 PROBLEM — H35.00 RETINOPATHY: Status: ACTIVE | Noted: 2021-04-27

## 2021-04-27 PROBLEM — E11.9 DIABETES MELLITUS, TYPE 2 (HCC): Status: ACTIVE | Noted: 2021-04-27

## 2021-04-27 PROBLEM — G62.9 NEUROPATHY: Status: ACTIVE | Noted: 2021-04-27

## 2021-04-27 LAB — GLUCOSE BLDC GLUCOMTR-MCNC: 267 MG/DL (ref 70–105)

## 2021-04-27 PROCEDURE — 82962 GLUCOSE BLOOD TEST: CPT | Performed by: INTERNAL MEDICINE

## 2021-04-27 PROCEDURE — 99214 OFFICE O/P EST MOD 30 MIN: CPT | Performed by: INTERNAL MEDICINE

## 2021-04-27 RX ORDER — GLUCAGON 3 MG/1
POWDER NASAL
COMMUNITY
End: 2023-02-23 | Stop reason: SDUPTHER

## 2021-04-27 RX ORDER — CYANOCOBALAMIN (VITAMIN B-12) 1000 MCG
TABLET ORAL
COMMUNITY
End: 2021-04-27

## 2021-04-27 RX ORDER — PROCHLORPERAZINE 25 MG/1
1 SUPPOSITORY RECTAL DAILY
Qty: 1 EACH | Refills: 0 | Status: SHIPPED | OUTPATIENT
Start: 2021-04-27

## 2021-04-27 RX ORDER — EZETIMIBE 10 MG/1
10 TABLET ORAL DAILY
Qty: 30 TABLET | Refills: 11 | Status: SHIPPED | OUTPATIENT
Start: 2021-04-27 | End: 2021-10-26

## 2021-04-27 RX ORDER — MAGNESIUM 200 MG
1000 TABLET ORAL DAILY
Qty: 100 EACH | Refills: 4 | Status: SHIPPED | OUTPATIENT
Start: 2021-04-27 | End: 2022-07-15

## 2021-04-27 RX ORDER — LOSARTAN POTASSIUM 25 MG/1
25 TABLET ORAL DAILY
Qty: 90 TABLET | Refills: 4 | Status: SHIPPED | OUTPATIENT
Start: 2021-04-27 | End: 2021-10-26 | Stop reason: SDUPTHER

## 2021-04-27 RX ORDER — PROCHLORPERAZINE 25 MG/1
SUPPOSITORY RECTAL
Qty: 2 EACH | Refills: 6 | Status: SHIPPED | OUTPATIENT
Start: 2021-04-27

## 2021-04-27 RX ORDER — PROCHLORPERAZINE 25 MG/1
1 SUPPOSITORY RECTAL DAILY
Qty: 1 EACH | Refills: 1 | Status: SHIPPED | OUTPATIENT
Start: 2021-04-27

## 2021-04-27 NOTE — PROGRESS NOTES
Endocrine Progress Note Outpatient     Patient Care Team:  Rama Brooks MD as PCP - General (Family Medicine)    Chief Complaint: Follow-up type 1 diabetes          HPI: 65-year-old female with previous diagnosis of type 1 diabetes, hypertension, hyperlipidemia and hypothyroidism is here for follow-up.  For type 1 diabetes: She is currently on Levemir 10 units twice a day with Humalog 4 units with each meal along with a sliding scale 1 unit for each 50 mg blood sugar over 150.  She did bring in blood sugar records for review she still having some high and low blood sugars but has not required any assistance or hospitalization since last seen.  She is interested in going back on insulin pump and also using his CGM S.  Hypertension: Her blood pressure is high today.  Hyperlipidemia: On atorvastatin  Hypothyroidism: On levothyroxine supplementation  CKD stage III disease: Follows with nephrology.    Data reviewed: Labs from April 19, 2021 showed a urine microalbumin creatinine ratio was less than 2  CMP showed sodium 138, potassium 3.6, chloride 100, CO2 34, glucose 130, BUN 18, creatinine 1.67, EGFR 31, HDL 58, total cholesterol 223,  and triglycerides of 167.  A1c from January was 7.6%.    Past Medical History:   Diagnosis Date   • Arthritis    • Diabetes mellitus type I (CMS/[facility])    • Hyperlipidemia    • Hypertension    • Hyperthyroidism    • Kidney disease    • Neuropathy        Social History     Socioeconomic History   • Marital status: Single     Spouse name: Not on file   • Number of children: Not on file   • Years of education: Not on file   • Highest education level: Not on file   Tobacco Use   • Smoking status: Former Smoker   • Smokeless tobacco: Never Used   Vaping Use   • Vaping Use: Never used   Substance and Sexual Activity   • Alcohol use: Never   • Drug use: Defer   • Sexual activity: Defer       Family History   Problem Relation Age of Onset   • Diabetes Father    • Diabetes Maternal  Grandmother        Allergies   Allergen Reactions   • Azithromycin GI Intolerance   • Erythromycin GI Intolerance       ROS:   Constitutional:  Denies fatigue, tiredness.    Eyes:  Denies change in visual acuity   HENT:  Denies nasal congestion or sore throat   Respiratory: denies cough, shortness of breath.   Cardiovascular:  denies chest pain, edema   GI:  Denies abdominal pain, nausea, vomiting.   Musculoskeletal:  Denies back pain or joint pain   Integument:  Denies dry skin and rash   Neurologic:  Denies headache, focal weakness or sensory changes   Endocrine:  Denies polyuria or polydipsia   Psychiatric:  Denies depression or anxiety      Vitals:    04/27/21 1338   BP: 165/75   Pulse: 75   Temp: 97.8 °F (36.6 °C)   SpO2: 98%     Body mass index is 27.8 kg/m².     Physical Exam:  GEN: NAD, conversant  EYES: EOMI, PERRL, no conjunctival erythema  NECK: no thyromegaly, full ROM   CV: RRR, no murmurs/rubs/gallops, no peripheral edema  LUNG: CTAB, no wheezes/rales/ronchi  SKIN: no rashes, no acanthosis  MSK: Has BL LE edema  NEURO: no tremors, DTR normal  PSYCH: AOX3, appropriate mood, affect normal      Results Review:     I reviewed the patient's new clinical results.    Medication Review: Reviewed.       Current Outpatient Medications:   •  acetaminophen (TYLENOL) 650 MG 8 hr tablet, Take 650 mg by mouth., Disp: , Rfl:   •  amLODIPine (NORVASC) 5 MG tablet, TAKE ONE TABLET BY MOUTH DAILY, Disp: , Rfl:   •  aspirin (aspirin) 81 MG EC tablet, Take 81 mg by mouth Daily., Disp: , Rfl:   •  atorvastatin (LIPITOR) 40 MG tablet, Take 40 mg by mouth Daily., Disp: , Rfl:   •  B Complex-Biotin-FA (B Complete) tablet, B COMPLETE TABS, Disp: , Rfl:   •  Blood Glucose Monitoring Suppl (ONE TOUCH ULTRA 2) w/Device kit, ONETOUCH ULTRA 2 w/Device KIT, Disp: , Rfl:   •  bumetanide (Bumex) 0.5 MG tablet, Take 0.5 mg by mouth Daily., Disp: , Rfl:   •  calcitriol (ROCALTROL) 0.25 MCG capsule, CALCITRIOL 0.25 MCG CAPS, Disp: , Rfl:    •  cetirizine (ZyrTEC Allergy) 10 MG tablet, ZYRTEC ALLERGY 10 MG TABS, Disp: , Rfl:   •  Cholecalciferol (Vitamin D-3) 25 MCG (1000 UT) capsule, Take 2,000 Units by mouth 2 (two) times a day., Disp: , Rfl:   •  Cyanocobalamin (B-12) 1000 MCG tablet, Take  by mouth., Disp: , Rfl:   •  ELDERBERRY PO, Take  by mouth., Disp: , Rfl:   •  Ferrous Sulfate (IRON PO), IRON TABS, Disp: , Rfl:   •  Glucagon (Baqsimi One Pack) 3 MG/DOSE powder, into the nostril(s) as directed by provider., Disp: , Rfl:   •  glucose blood (ONE TOUCH ULTRA TEST) test strip, ONETOUCH ULTRA BLUE STRP, Disp: , Rfl:   •  hydroxychloroquine (PLAQUENIL) 200 MG tablet, Take 200 mg by mouth 2 (Two) Times a Day., Disp: , Rfl:   •  insulin detemir (Levemir FlexTouch) 100 UNIT/ML injection, Inject 20 units subcu in a.m., Disp: 5 pen, Rfl: 6  •  Insulin Lispro, 1 Unit Dial, (HumaLOG KwikPen) 100 UNIT/ML solution pen-injector, Inject 6 units with each meal along with a sliding scale of 1 unit for each 50 mg blood sugar over 150 at meals., Disp: 5 pen, Rfl: 6  •  Lancets (onetouch ultrasoft) lancets, ONETOUCH ULTRASOFT LANCETS, Disp: , Rfl:   •  levothyroxine (SYNTHROID, LEVOTHROID) 150 MCG tablet, Take  by mouth Daily., Disp: , Rfl:   •  losartan (COZAAR) 25 MG tablet, Take 25 mg by mouth Daily., Disp: , Rfl:   •  Multiple Vitamins-Minerals (OCUVITE ADULT 50+ PO), Take  by mouth., Disp: , Rfl:   •  Omega-3 Krill Oil 300 MG capsule, OMEGA-3 KRILL  MG CAPS, Disp: , Rfl:   •  omeprazole (priLOSEC) 20 MG capsule, Take 20 mg by mouth., Disp: , Rfl:   •  omeprazole (priLOSEC) 40 MG capsule, Take 40 mg by mouth Daily. Alternate days then 20 on the other days, Disp: , Rfl:   •  ondansetron (ZOFRAN) 4 MG tablet, Take 4 mg by mouth Every 8 (Eight) Hours As Needed for Nausea or Vomiting., Disp: , Rfl:   •  prenatal vitamin (prenatal, CLASSIC, vitamin) tablet, Take  by mouth Daily., Disp: , Rfl:   •  Thiamine HCl (vitamin B-1) 50 MG tablet, Take 50 mg by mouth  Daily., Disp: , Rfl:   •  Turmeric 500 MG capsule, Take  by mouth., Disp: , Rfl:   •  vitamin B-6 (PYRIDOXINE) 50 MG tablet, Take 50 mg by mouth Daily., Disp: , Rfl:       Assessment/Plan   1.  Diabetes mellitus type 1: Uncontrolled, she will benefit from insulin pump as well as continuous monitoring system.  I will send prescription for Dexcom and set up for diabetes educators for tandem insulin pump.  For now continue current regimen for insulin.  Advised to always keep glucose source in case of low blood sugars.  Annual eye exam and flu vaccine.  2.  Hypertension: Uncontrolled, increase losartan to 25 mg p.o. daily as he has been only taking half a pill of 25 once a day.  3.  Hyperlipidemia: Uncontrolled with high LDL.  She is on atorvastatin 40 mg p.o. daily.  I will add Zetia 10 mg p.o. daily.  4.  Hypothyroidism: On levothyroxine supplementation, will follow TSH and free T4  5.  CKD stage III disease: Follows with Dr. Pires.  6.  Diabetic peripheral neuropathy: She is on Vit D, B12.             Jo Mahoney MD FACE.    6/8/2021: Addendum: Patient is checking her blood sugars 4 times daily.

## 2021-04-27 NOTE — PATIENT INSTRUCTIONS
Continue current management  Increase losartan to 25 mg p.o. daily  Start Zetia 10 mg p.o. daily  Take vitamin B12 1000 mcg sublingual daily  Start Dexcom continuous glucose monitoring system, prescriptions have been sent to your pharmacy  Arrange diabetes educator consultation for insulin pump, recommend tandem insulin pump  Always keep glucose source in case of low blood sugar  Annual eye exam and flu vaccine  Labs before follow-up.

## 2021-05-05 ENCOUNTER — TELEPHONE (OUTPATIENT)
Dept: ENDOCRINOLOGY | Facility: CLINIC | Age: 66
End: 2021-05-05

## 2021-05-06 ENCOUNTER — TELEPHONE (OUTPATIENT)
Dept: ENDOCRINOLOGY | Facility: CLINIC | Age: 66
End: 2021-05-06

## 2021-05-06 NOTE — TELEPHONE ENCOUNTER
Pt's traditional Medicare requires pt to use [facility] for Dexcom instead of local pharmacy. Pt has an old [employer]andem pump that was given to her from a family member but with outdated infusion supplies.  Will email [employer] about getting infusion supplies before pump start. Pt to make sure she has the charging cable for her pump.  Will need to send rx for Humalog vials before pump appt on 5/25 and work on orders.  Pt was on a Medtronic pump previously.

## 2021-05-06 NOTE — TELEPHONE ENCOUNTER
[employer] emailed that pt's needs to call [facility].  Attempted to call pt but VM box full.  Emailed pt info to call [facility] to set up account and order cartridges and infusion sets.

## 2021-05-25 ENCOUNTER — OFFICE VISIT (OUTPATIENT)
Dept: ENDOCRINOLOGY | Facility: CLINIC | Age: 66
End: 2021-05-25

## 2021-05-25 DIAGNOSIS — E10.65 TYPE 1 DIABETES MELLITUS WITH HYPERGLYCEMIA (HCC): ICD-10-CM

## 2021-05-25 PROCEDURE — G0108 DIAB MANAGE TRN  PER INDIV: HCPCS | Performed by: DIETITIAN, REGISTERED

## 2021-05-25 NOTE — PROGRESS NOTES
Spent 30 minutes with patient.  Pt wants to get the Dexcom G6 and tslim x 2 with Control IQ.  Submitted orders via [employer] and faxed AOB to [employer] with OV, insurance cards and face sheet.

## 2021-06-03 ENCOUNTER — TELEPHONE (OUTPATIENT)
Dept: ENDOCRINOLOGY | Facility: CLINIC | Age: 66
End: 2021-06-03

## 2021-06-03 NOTE — TELEPHONE ENCOUNTER
Attempted to call to schedule Dexcom training but had to LVM.  Pt also needs to schedule c-peptide and FBS for pump ppw.

## 2021-06-07 ENCOUNTER — TELEPHONE (OUTPATIENT)
Dept: ENDOCRINOLOGY | Facility: CLINIC | Age: 66
End: 2021-06-07

## 2021-06-07 DIAGNOSIS — E10.65 UNCONTROLLED TYPE 1 DIABETES MELLITUS WITH HYPERGLYCEMIA (HCC): Primary | ICD-10-CM

## 2021-06-07 NOTE — TELEPHONE ENCOUNTER
Dr. CANTRELL,    For pt's pump upgrade, insurance is requesting that pt's last OV on 4/27/21 to be appended saying:    'pt is checking her blood sugars 4 times daily.'    The BG's in her chart on 4/20/21 show that she is checking QID.  Once appended, educator to include in LMN, c-peptide and FBG for MD signature and then fax to Mike.    Thank you!

## 2021-06-07 NOTE — PROGRESS NOTES
Medicare requires a c-peptide and FBS for pump upgrade. Notified pt her FBG has to be <225. Scheduled for 6/8/21 at 10:30 am. Orders ready for signature.

## 2021-06-08 ENCOUNTER — LAB (OUTPATIENT)
Dept: LAB | Facility: HOSPITAL | Age: 66
End: 2021-06-08

## 2021-06-08 DIAGNOSIS — E10.21 TYPE 1 DIABETES MELLITUS WITH DIABETIC NEPHROPATHY (HCC): ICD-10-CM

## 2021-06-08 DIAGNOSIS — E10.65 UNCONTROLLED TYPE 1 DIABETES MELLITUS WITH HYPERGLYCEMIA (HCC): ICD-10-CM

## 2021-06-08 DIAGNOSIS — E03.9 ACQUIRED HYPOTHYROIDISM: ICD-10-CM

## 2021-06-08 LAB
ALBUMIN SERPL-MCNC: 3.2 G/DL (ref 3.5–5.2)
ALBUMIN UR-MCNC: 125.6 MG/DL
ALBUMIN/GLOB SERPL: 1.4 G/DL
ALP SERPL-CCNC: 73 U/L (ref 39–117)
ALT SERPL W P-5'-P-CCNC: 27 U/L (ref 1–33)
ANION GAP SERPL CALCULATED.3IONS-SCNC: 8.1 MMOL/L (ref 5–15)
AST SERPL-CCNC: 35 U/L (ref 1–32)
BILIRUB SERPL-MCNC: 0.2 MG/DL (ref 0–1.2)
BUN SERPL-MCNC: 26 MG/DL (ref 8–23)
BUN/CREAT SERPL: 14.5 (ref 7–25)
CALCIUM SPEC-SCNC: 9 MG/DL (ref 8.6–10.5)
CHLORIDE SERPL-SCNC: 102 MMOL/L (ref 98–107)
CHOLEST SERPL-MCNC: 129 MG/DL (ref 0–200)
CO2 SERPL-SCNC: 28.9 MMOL/L (ref 22–29)
CREAT SERPL-MCNC: 1.79 MG/DL (ref 0.57–1)
CREAT UR-MCNC: 28.2 MG/DL
GFR SERPL CREATININE-BSD FRML MDRD: 28 ML/MIN/1.73
GLOBULIN UR ELPH-MCNC: 2.3 GM/DL
GLUCOSE SERPL-MCNC: 139 MG/DL (ref 65–99)
HDLC SERPL-MCNC: 53 MG/DL (ref 40–60)
LDLC SERPL CALC-MCNC: 57 MG/DL (ref 0–100)
LDLC/HDLC SERPL: 1.04 {RATIO}
MICROALBUMIN/CREAT UR: 4453.9 MG/G
POTASSIUM SERPL-SCNC: 4.3 MMOL/L (ref 3.5–5.2)
PROT SERPL-MCNC: 5.5 G/DL (ref 6–8.5)
SODIUM SERPL-SCNC: 139 MMOL/L (ref 136–145)
TRIGL SERPL-MCNC: 105 MG/DL (ref 0–150)
VLDLC SERPL-MCNC: 19 MG/DL (ref 5–40)

## 2021-06-08 PROCEDURE — 80053 COMPREHEN METABOLIC PANEL: CPT

## 2021-06-08 PROCEDURE — 80061 LIPID PANEL: CPT

## 2021-06-08 PROCEDURE — 36415 COLL VENOUS BLD VENIPUNCTURE: CPT

## 2021-06-08 PROCEDURE — 82043 UR ALBUMIN QUANTITATIVE: CPT

## 2021-06-08 PROCEDURE — 84681 ASSAY OF C-PEPTIDE: CPT

## 2021-06-08 PROCEDURE — 82570 ASSAY OF URINE CREATININE: CPT

## 2021-06-09 LAB — C PEPTIDE SERPL-MCNC: <0.1 NG/ML (ref 1.1–4.4)

## 2021-06-17 ENCOUNTER — TELEPHONE (OUTPATIENT)
Dept: ENDOCRINOLOGY | Facility: CLINIC | Age: 66
End: 2021-06-17

## 2021-06-17 DIAGNOSIS — E11.65 UNCONTROLLED TYPE 2 DIABETES MELLITUS WITH HYPERGLYCEMIA (HCC): Primary | ICD-10-CM

## 2021-06-17 NOTE — TELEPHONE ENCOUNTER
Scheduled pump training on 7/14/21 at 8 am with George.  Training orders on MD desk for signature. Rx for Humalog vials ready for signature.

## 2021-06-22 ENCOUNTER — TELEPHONE (OUTPATIENT)
Dept: ENDOCRINOLOGY | Facility: CLINIC | Age: 66
End: 2021-06-22

## 2021-06-22 ENCOUNTER — OFFICE VISIT (OUTPATIENT)
Dept: ENDOCRINOLOGY | Facility: CLINIC | Age: 66
End: 2021-06-22

## 2021-06-22 DIAGNOSIS — E10.21 TYPE 1 DIABETES MELLITUS WITH DIABETIC NEPHROPATHY (HCC): ICD-10-CM

## 2021-06-22 DIAGNOSIS — E10.65 UNCONTROLLED TYPE 1 DIABETES MELLITUS WITH HYPERGLYCEMIA (HCC): Primary | ICD-10-CM

## 2021-06-22 PROCEDURE — G0108 DIAB MANAGE TRN  PER INDIV: HCPCS | Performed by: DIETITIAN, REGISTERED

## 2021-06-22 RX ORDER — PEN NEEDLE, DIABETIC 32GX 5/32"
NEEDLE, DISPOSABLE MISCELLANEOUS
COMMUNITY
End: 2021-06-22 | Stop reason: SDUPTHER

## 2021-06-22 NOTE — PROGRESS NOTES
Spent one hour with patient. Assisted pt in setting up her Dexcom  and Dexcom Clarity account.  Helped pt accept an email invite to share her reports for when she downloads her  at home.  Pt was able to successfully apply a Dexcom sensor and transmitter.  Stats and ed record scanned into visit.  Instructed pt when to download her reports and to notify office staff.

## 2021-06-27 RX ORDER — INSULIN DETEMIR 100 [IU]/ML
INJECTION, SOLUTION SUBCUTANEOUS
Qty: 2 PEN | Refills: 1 | Status: SHIPPED | OUTPATIENT
Start: 2021-06-27 | End: 2021-10-26

## 2021-06-27 RX ORDER — PEN NEEDLE, DIABETIC 32GX 5/32"
NEEDLE, DISPOSABLE MISCELLANEOUS
Qty: 150 EACH | Refills: 1 | Status: SHIPPED | OUTPATIENT
Start: 2021-06-27

## 2021-07-02 ENCOUNTER — TELEPHONE (OUTPATIENT)
Dept: ENDOCRINOLOGY | Facility: CLINIC | Age: 66
End: 2021-07-02

## 2021-07-02 NOTE — TELEPHONE ENCOUNTER
Dexcom reports scanned into phone note. Per MD s/o recommended no changes at this time d/t pt not having any patterns in BGs. Has both highs and lows. Discussed trying to have more consistency with carbs.

## 2021-07-14 ENCOUNTER — OFFICE VISIT (OUTPATIENT)
Dept: ENDOCRINOLOGY | Facility: CLINIC | Age: 66
End: 2021-07-14

## 2021-07-14 DIAGNOSIS — E10.65 TYPE 1 DIABETES MELLITUS WITH HYPERGLYCEMIA (HCC): ICD-10-CM

## 2021-07-14 NOTE — PROGRESS NOTES
No charge for pump patient.  Will be reimbursed by pump company. Stats, orders and checklists scanned into visit. Pt will be downloading [employer] uploader on computer at home to upload reports.

## 2021-07-16 ENCOUNTER — TELEPHONE (OUTPATIENT)
Dept: ENDOCRINOLOGY | Facility: CLINIC | Age: 66
End: 2021-07-16

## 2021-07-16 NOTE — TELEPHONE ENCOUNTER
Pt left VM to let Eduarda know that she got her [employer] account set up. Attempted to call pt but had to leave a VM to call back Eduarda so educator can add pt on [employer].     Was also going to f/u on pump training per [employer] training requirements.

## 2021-07-19 ENCOUNTER — TELEPHONE (OUTPATIENT)
Dept: ENDOCRINOLOGY | Facility: CLINIC | Age: 66
End: 2021-07-19

## 2021-07-19 NOTE — TELEPHONE ENCOUNTER
[employer] reports scanned into phone note. Per MD s/o decreased basal rate to 0.7. Pt was able to make changes while on the phone.

## 2021-07-29 ENCOUNTER — TELEPHONE (OUTPATIENT)
Dept: ENDOCRINOLOGY | Facility: CLINIC | Age: 66
End: 2021-07-29

## 2021-07-29 ENCOUNTER — OFFICE VISIT (OUTPATIENT)
Dept: ENDOCRINOLOGY | Facility: CLINIC | Age: 66
End: 2021-07-29

## 2021-07-29 DIAGNOSIS — E10.65 TYPE 1 DIABETES MELLITUS WITH HYPERGLYCEMIA (HCC): ICD-10-CM

## 2021-07-29 PROCEDURE — G0108 DIAB MANAGE TRN  PER INDIV: HCPCS | Performed by: DIETITIAN, REGISTERED

## 2021-07-29 NOTE — PROGRESS NOTES
Spent 45 mins with pt for individual pump training f/u appt. She had to change her infusion set last night instead of this morning because she had ran out of insulin in her cartridge. She stated that she took the air out of the cartridge but she may have not had enough units in her syringe. She wanted to know how she could use her camera to take a picture of Dexcom sensor code when changing her sensor because she saw someone on the Internet do it. Advised pt she could download Dexcom G6 ruben on her phone in order to do so. She has a separate phone that she uses just for Tconnect ruben and now will be the Dexcom G6 ruben as well. She was also thinking about getting an infusion set with longer tubing. Advised she called the DME supplier she received her pump supplies from. [employer] reports scanned into phone note. Pt c/o lows and thinks her correction factor is too much. Per MD s/o decrease basal rate to 0.6 and CF to 1:75. Pt made changes in pump with assistance. She will call if she continues to have lows. She also stated that she sometimes forgets to bolus before her meal and will do it right in the middle of eating.

## 2021-08-12 DIAGNOSIS — E78.2 MIXED HYPERLIPIDEMIA: Primary | ICD-10-CM

## 2021-08-12 NOTE — TELEPHONE ENCOUNTER
CPK was elevated at more than 500 asked patient to stay off atorvastatin and Zetia and check CPK in 4 weeks.

## 2021-09-14 ENCOUNTER — TELEPHONE (OUTPATIENT)
Dept: ENDOCRINOLOGY | Facility: CLINIC | Age: 66
End: 2021-09-14

## 2021-10-04 ENCOUNTER — TELEPHONE (OUTPATIENT)
Dept: ENDOCRINOLOGY | Facility: CLINIC | Age: 66
End: 2021-10-04

## 2021-10-04 NOTE — TELEPHONE ENCOUNTER
Pt wants us to know that she plans on uploading her BG's to [employer]/Dexcom 10/5/21 if we could check that afternoon for her readings.

## 2021-10-06 NOTE — TELEPHONE ENCOUNTER
Called pt to let her know that [employer] reports were never uploaded. Pt tried to download reports again with educator while on the phone, but was not successful. Encouraged pt to come to her appt with Dr. CANTRELL on 10/26 a couple mins earlier and to ask for educator to help show her how to upload her reports to [employer].

## 2021-10-08 ENCOUNTER — TELEPHONE (OUTPATIENT)
Dept: ENDOCRINOLOGY | Facility: CLINIC | Age: 66
End: 2021-10-08

## 2021-10-26 ENCOUNTER — OFFICE VISIT (OUTPATIENT)
Dept: ENDOCRINOLOGY | Facility: CLINIC | Age: 66
End: 2021-10-26

## 2021-10-26 VITALS
DIASTOLIC BLOOD PRESSURE: 75 MMHG | SYSTOLIC BLOOD PRESSURE: 155 MMHG | BODY MASS INDEX: 27.6 KG/M2 | HEART RATE: 73 BPM | WEIGHT: 150 LBS | OXYGEN SATURATION: 97 % | TEMPERATURE: 97.3 F | HEIGHT: 62 IN

## 2021-10-26 DIAGNOSIS — E10.21 TYPE 1 DIABETES MELLITUS WITH DIABETIC NEPHROPATHY (HCC): Primary | ICD-10-CM

## 2021-10-26 DIAGNOSIS — N18.32 STAGE 3B CHRONIC KIDNEY DISEASE (HCC): ICD-10-CM

## 2021-10-26 DIAGNOSIS — I10 ESSENTIAL HYPERTENSION: ICD-10-CM

## 2021-10-26 DIAGNOSIS — E03.9 ACQUIRED HYPOTHYROIDISM: ICD-10-CM

## 2021-10-26 DIAGNOSIS — E78.2 MIXED HYPERLIPIDEMIA: ICD-10-CM

## 2021-10-26 PROCEDURE — 99214 OFFICE O/P EST MOD 30 MIN: CPT | Performed by: INTERNAL MEDICINE

## 2021-10-26 RX ORDER — DOXYCYCLINE HYCLATE 50 MG/1
324 CAPSULE, GELATIN COATED ORAL DAILY
COMMUNITY
End: 2022-08-22 | Stop reason: SDUPTHER

## 2021-10-26 RX ORDER — CHLORAL HYDRATE 500 MG
CAPSULE ORAL
COMMUNITY
End: 2022-08-22 | Stop reason: ALTCHOICE

## 2021-10-26 RX ORDER — LEVOTHYROXINE SODIUM 137 UG/1
TABLET ORAL
Qty: 30 TABLET | Refills: 6 | Status: SHIPPED | OUTPATIENT
Start: 2021-10-26 | End: 2022-04-28 | Stop reason: SDUPTHER

## 2021-10-26 RX ORDER — ROSUVASTATIN CALCIUM 10 MG/1
10 TABLET, COATED ORAL NIGHTLY
Qty: 30 TABLET | Refills: 11 | Status: SHIPPED | OUTPATIENT
Start: 2021-10-26 | End: 2022-11-08

## 2021-10-26 RX ORDER — LOSARTAN POTASSIUM 50 MG/1
TABLET ORAL
Qty: 30 TABLET | Refills: 6 | Status: SHIPPED | OUTPATIENT
Start: 2021-10-26 | End: 2022-04-28 | Stop reason: SDUPTHER

## 2021-10-26 RX ORDER — ESOMEPRAZOLE MAGNESIUM 40 MG/1
1 CAPSULE, DELAYED RELEASE ORAL DAILY
COMMUNITY
Start: 2021-05-18

## 2021-10-26 NOTE — PROGRESS NOTES
Endocrine Progress Note Outpatient     Patient Care Team:  Rama Brooks MD as PCP - General (Family Medicine)    Chief Complaint: Follow-up type 1 diabetes    HPI: 66-year-old female with previous diagnosis of type 1 diabetes, hypertension, hyperlipidemia and hypothyroidism is here for follow-up.    For type 1 diabetes: She is currently on the slim insulin pump with Dexcom monitoring system.  Current basal rates are as follows midnight 0.6 units/h.  Insulin carb ratio is 1 unit for each 15 g of carbohydrate.  Correction factor is 1 unit for each 85 mg blood sugar with a target blood sugar 110.      Hypertension: Her blood pressure is high today.    Hyperlipidemia: She is off all lipid-lowering agents.    Hypothyroidism: On levothyroxine supplementation    Diabetic nephropathy/CKD stage III disease: Follows with nephrology.        Past Medical History:   Diagnosis Date   • Arthritis    • Diabetes mellitus type I (HCC)    • Hyperlipidemia    • Hypertension    • Hyperthyroidism    • Kidney disease    • Neuropathy    • Wrist fracture        Social History     Socioeconomic History   • Marital status: Single   Tobacco Use   • Smoking status: Former Smoker   • Smokeless tobacco: Never Used   Vaping Use   • Vaping Use: Never used   Substance and Sexual Activity   • Alcohol use: Never   • Drug use: Defer   • Sexual activity: Defer       Family History   Problem Relation Age of Onset   • Diabetes Father    • Diabetes Maternal Grandmother        Allergies   Allergen Reactions   • Azithromycin GI Intolerance   • Erythromycin GI Intolerance       ROS:   Constitutional:  Denies fatigue, tiredness.    Eyes:  Denies change in visual acuity   HENT:  Denies nasal congestion or sore throat   Respiratory: denies cough, shortness of breath.   Cardiovascular:  denies chest pain, edema   GI:  Denies abdominal pain, nausea, vomiting.   Musculoskeletal:  Denies back pain or joint pain   Integument:  Denies dry skin and rash    Neurologic:  Denies headache, focal weakness or sensory changes   Endocrine:  Denies polyuria or polydipsia   Psychiatric:  Denies depression or anxiety      Vitals:    10/26/21 1347   BP: 155/75   Pulse: 73   Temp: 97.3 °F (36.3 °C)   SpO2: 97%     Body mass index is 27.44 kg/m².     Physical Exam:  GEN: NAD, conversant  EYES: EOMI, PERRL, no conjunctival erythema  NECK: no thyromegaly, full ROM   CV: RRR, no murmurs/rubs/gallops, no peripheral edema  LUNG: CTAB, no wheezes/rales/ronchi  SKIN: no rashes, no acanthosis  MSK: Has BL LE edema  NEURO: no tremors, DTR normal  PSYCH: AOX3, appropriate mood, affect normal  FEET: Has bilateral bunions, monofilament test was absent on both feet toes.  Good dorsal pedis pulses on both feet.    Results Review:     I reviewed the patient's new clinical results.      Dexcom download interpretation: Dates covered was between October 23, 2021 to October 25, 2021.  Spending 100% time in the range.  Average blood sugar was 157.  Standard deviation 46.    Labs from October 18 of 2021 showed a serum sodium 141, potassium 3.6, chloride 104, CO2 29, glucose 125, BUN 27, creatinine 1.8, AST 40, ALT 34  Total cholesterol 436 with  and triglycerides 148  Urine microalbumin ratio was 6309, A1c 6.5, TSH 0.012 and free T4 0.84.    Medication Review: Reviewed.       Current Outpatient Medications:   •  acetaminophen (TYLENOL) 650 MG 8 hr tablet, Take 650 mg by mouth., Disp: , Rfl:   •  amLODIPine (NORVASC) 5 MG tablet, TAKE ONE TABLET BY MOUTH DAILY, Disp: , Rfl:   •  aspirin (aspirin) 81 MG EC tablet, Take 81 mg by mouth Daily., Disp: , Rfl:   •  B Complex-Biotin-FA (B Complete) tablet, B COMPLETE TABS, Disp: , Rfl:   •  BD Pen Needle Lupe 2nd Gen 32G X 4 MM misc, Pt to use with insulin pens 5 times daily, Disp: 150 each, Rfl: 1  •  Blood Glucose Monitoring Suppl (ONE TOUCH ULTRA 2) w/Device kit, ONETOUCH ULTRA 2 w/Device KIT, Disp: , Rfl:   •  bumetanide (Bumex) 0.5 MG tablet, Take  0.5 mg by mouth Daily., Disp: , Rfl:   •  calcitriol (ROCALTROL) 0.25 MCG capsule, CALCITRIOL 0.25 MCG CAPS, Disp: , Rfl:   •  cetirizine (ZyrTEC Allergy) 10 MG tablet, ZYRTEC ALLERGY 10 MG TABS, Disp: , Rfl:   •  Cholecalciferol (Vitamin D-3) 25 MCG (1000 UT) capsule, Take 2,000 Units by mouth 2 (two) times a day., Disp: , Rfl:   •  Continuous Blood Gluc  (Dexcom G6 ) device, 1 Device Daily., Disp: 1 each, Rfl: 1  •  Continuous Blood Gluc Sensor (Dexcom G6 Sensor), Every 10 (Ten) Days., Disp: 2 each, Rfl: 6  •  Continuous Blood Gluc Transmit (Dexcom G6 Transmitter) misc, 1 Device Daily., Disp: 1 each, Rfl: 0  •  Cyanocobalamin (Vitamin B-12) 1000 MCG sublingual tablet, Place 1,000 mcg under the tongue Daily., Disp: 100 each, Rfl: 4  •  ELDERBERRY PO, Take  by mouth., Disp: , Rfl:   •  esomeprazole (nexIUM) 40 MG capsule, , Disp: , Rfl:   •  ferrous gluconate (FERGON) 324 MG tablet, Take 324 mg by mouth Daily., Disp: , Rfl:   •  Ferrous Sulfate (IRON PO), IRON TABS, Disp: , Rfl:   •  Glucagon (Baqsimi One Pack) 3 MG/DOSE powder, into the nostril(s) as directed by provider., Disp: , Rfl:   •  glucose blood (ONE TOUCH ULTRA TEST) test strip, ONETOUCH ULTRA BLUE STRP, Disp: , Rfl:   •  HumaLOG 100 UNIT/ML injection, Pt to use as directed in insulin pump. MDD: 40, Disp: 20 mL, Rfl: 3  •  hydroxychloroquine (PLAQUENIL) 200 MG tablet, Take 200 mg by mouth 2 (Two) Times a Day., Disp: , Rfl:   •  insulin detemir (Levemir FlexTouch) 100 UNIT/ML injection, Pt to Inject SQ 10 units in the am and 10 units in the pm, Disp: 2 pen, Rfl: 1  •  Insulin Lispro, 1 Unit Dial, (HumaLOG KwikPen) 100 UNIT/ML solution pen-injector, Inject 6 units with each meal along with a sliding scale of 1 unit for each 50 mg blood sugar over 150 at meals., Disp: 5 pen, Rfl: 6  •  Lancets (onetouch ultrasoft) lancets, ONETOUCH ULTRASOFT LANCETS, Disp: , Rfl:   •  levothyroxine (SYNTHROID, LEVOTHROID) 150 MCG tablet, Take  by mouth  Daily., Disp: , Rfl:   •  losartan (COZAAR) 25 MG tablet, Take 1 tablet by mouth Daily., Disp: 90 tablet, Rfl: 4  •  Multiple Vitamins-Minerals (OCUVITE ADULT 50+ PO), Take  by mouth., Disp: , Rfl:   •  Omega-3 Fatty Acids (fish oil) 1000 MG capsule capsule, Take  by mouth., Disp: , Rfl:   •  Omega-3 Krill Oil 300 MG capsule, OMEGA-3 KRILL  MG CAPS, Disp: , Rfl:   •  ondansetron (ZOFRAN) 4 MG tablet, Take 4 mg by mouth Every 8 (Eight) Hours As Needed for Nausea or Vomiting., Disp: , Rfl:   •  prenatal vitamin (prenatal, CLASSIC, vitamin) tablet, Take  by mouth Daily., Disp: , Rfl:   •  Thiamine HCl (vitamin B-1) 50 MG tablet, Take 50 mg by mouth Daily., Disp: , Rfl:   •  Turmeric 500 MG capsule, Take  by mouth., Disp: , Rfl:   •  vitamin B-6 (PYRIDOXINE) 50 MG tablet, Take 50 mg by mouth Daily., Disp: , Rfl:       Assessment/Plan   1.  Diabetes mellitus type 1: Much better with A1c now at 6.5%.  Dexcom download interpretation reviewed, will continue current insulin pump management.  Advised to always keep glucose source in case of low blood sugars.  She is using T slim with Dexcom monitoring system.    2.  Hypertension: Uncontrolled, increase losartan to 50 mg p.o. daily and follow blood pressure.    3.  Hyperlipidemia: Uncontrolled, with very high LDL of 289, will add rosuvastatin 10 mg p.o. daily and follow lipid panel.    4.  Hypothyroidism: Uncontrolled with low TSH, will reduce levothyroxine to 137 mcg p.o. daily and follow labs.    5.  CKD stage III disease: Follows with Dr. Pires.    6.  Diabetic peripheral neuropathy: She is on Vit D, B12.  She will benefit from diabetic shoes.            Jo Mahoney MD FACE.    6/8/2021: Addendum: Patient is checking her blood sugars 4 times daily.

## 2021-10-26 NOTE — PATIENT INSTRUCTIONS
Increase losartan to 50 mg p.o. daily  Check BMP in 2 weeks  Decrease levothyroxine to 137 mcg p.o. daily  Start Crestor 10 mg p.o. daily  Check CMP, lipid panel, TSH and free T4 in 6 weeks  Check CMP, A1c, lipid panel, urine microalbumin creatinine ratio, TSH and free T4 in 6 months.  Always keep glucose source in case of low blood sugar  Annual eye exam and flu vaccine  Labs before follow-up.

## 2021-11-04 ENCOUNTER — TELEPHONE (OUTPATIENT)
Dept: ENDOCRINOLOGY | Facility: CLINIC | Age: 66
End: 2021-11-04

## 2021-12-06 ENCOUNTER — TELEPHONE (OUTPATIENT)
Dept: ENDOCRINOLOGY | Facility: CLINIC | Age: 66
End: 2021-12-06

## 2021-12-06 NOTE — TELEPHONE ENCOUNTER
Tconnect reports scanned into phone note.  Per MD s/o, instructed to change her 12 am basal rate to 0.575 and added a 7 am basal rate of 0.600 due to low BG's in the early am.  Pt was able to make changes while on phone.  Pt to download her pump again in a couple weeks.

## 2021-12-15 ENCOUNTER — TELEPHONE (OUTPATIENT)
Dept: ENDOCRINOLOGY | Facility: CLINIC | Age: 66
End: 2021-12-15

## 2021-12-30 RX ORDER — INSULIN LISPRO 100 [IU]/ML
INJECTION, SOLUTION INTRAVENOUS; SUBCUTANEOUS
Qty: 15 ML | Refills: 5 | Status: SHIPPED | OUTPATIENT
Start: 2021-12-30 | End: 2022-08-22 | Stop reason: SDUPTHER

## 2022-01-14 ENCOUNTER — TELEPHONE (OUTPATIENT)
Dept: ENDOCRINOLOGY | Facility: CLINIC | Age: 67
End: 2022-01-14

## 2022-01-14 NOTE — TELEPHONE ENCOUNTER
Tconnect reports scanned into phone note. Attempted to call pt but had to LVM to call back Eduarda.

## 2022-01-17 DIAGNOSIS — E11.65 UNCONTROLLED TYPE 2 DIABETES MELLITUS WITH HYPERGLYCEMIA: ICD-10-CM

## 2022-03-18 ENCOUNTER — TELEPHONE (OUTPATIENT)
Dept: ENDOCRINOLOGY | Facility: CLINIC | Age: 67
End: 2022-03-18

## 2022-03-18 NOTE — TELEPHONE ENCOUNTER
Tconnect reports scanned into phone note. Attempted to call pt but had to LVM. Some overnight lows noted. May need to decrease basal rate.

## 2022-03-21 NOTE — TELEPHONE ENCOUNTER
Pt called back and per MD s/o, instructed pt to lower her 12 am basal rate to 0.525 and change her 7 am ICR to 1:20 due to pt c/o low BG's over night and after meals.  Pt was able to make pump settings over the phone.

## 2022-04-01 ENCOUNTER — TELEPHONE (OUTPATIENT)
Dept: ENDOCRINOLOGY | Facility: CLINIC | Age: 67
End: 2022-04-01

## 2022-04-01 NOTE — TELEPHONE ENCOUNTER
Tconnect reports scanned into phone note. Attempted to call pt but had to LVM to call back Eduarda. Likely no changes per MD s/o.

## 2022-04-15 LAB — HEMOGLOBIN A1: 6.4

## 2022-04-22 ENCOUNTER — TELEPHONE (OUTPATIENT)
Dept: ENDOCRINOLOGY | Facility: CLINIC | Age: 67
End: 2022-04-22

## 2022-04-22 NOTE — TELEPHONE ENCOUNTER
Tried to call patient regarding Dr. Mahoney being out of office on 4/26 and to reschedule her appt that day. No answer, left vm asking to contact our office.

## 2022-04-28 ENCOUNTER — OFFICE VISIT (OUTPATIENT)
Dept: ENDOCRINOLOGY | Facility: CLINIC | Age: 67
End: 2022-04-28

## 2022-04-28 VITALS
OXYGEN SATURATION: 97 % | DIASTOLIC BLOOD PRESSURE: 75 MMHG | HEART RATE: 75 BPM | HEIGHT: 62 IN | SYSTOLIC BLOOD PRESSURE: 145 MMHG | WEIGHT: 153 LBS | BODY MASS INDEX: 28.16 KG/M2 | TEMPERATURE: 97 F

## 2022-04-28 DIAGNOSIS — N18.32 STAGE 3B CHRONIC KIDNEY DISEASE: ICD-10-CM

## 2022-04-28 DIAGNOSIS — E03.9 ACQUIRED HYPOTHYROIDISM: ICD-10-CM

## 2022-04-28 DIAGNOSIS — E10.21 TYPE 1 DIABETES MELLITUS WITH DIABETIC NEPHROPATHY: Primary | ICD-10-CM

## 2022-04-28 DIAGNOSIS — E78.2 MIXED HYPERLIPIDEMIA: ICD-10-CM

## 2022-04-28 DIAGNOSIS — I10 ESSENTIAL HYPERTENSION: ICD-10-CM

## 2022-04-28 LAB — GLUCOSE BLDC GLUCOMTR-MCNC: 190 MG/DL (ref 70–105)

## 2022-04-28 PROCEDURE — 99214 OFFICE O/P EST MOD 30 MIN: CPT | Performed by: INTERNAL MEDICINE

## 2022-04-28 PROCEDURE — 82962 GLUCOSE BLOOD TEST: CPT | Performed by: INTERNAL MEDICINE

## 2022-04-28 RX ORDER — LOSARTAN POTASSIUM 50 MG/1
50 TABLET ORAL DAILY
COMMUNITY
Start: 2022-02-17 | End: 2022-08-22 | Stop reason: ALTCHOICE

## 2022-04-28 RX ORDER — FERROUS SULFATE 325(65) MG
1 TABLET ORAL DAILY
COMMUNITY

## 2022-04-28 RX ORDER — AMLODIPINE BESYLATE 10 MG/1
10 TABLET ORAL DAILY
COMMUNITY
Start: 2022-02-17 | End: 2022-11-03

## 2022-04-28 RX ORDER — EZETIMIBE 10 MG/1
10 TABLET ORAL DAILY
COMMUNITY

## 2022-04-28 RX ORDER — TETRACYCLINE HCL 500 MG
1 CAPSULE ORAL DAILY
COMMUNITY

## 2022-04-28 RX ORDER — LEVOTHYROXINE SODIUM 0.15 MG/1
TABLET ORAL
Qty: 90 TABLET | Refills: 4 | Status: SHIPPED | OUTPATIENT
Start: 2022-04-28 | End: 2023-01-24 | Stop reason: SDUPTHER

## 2022-04-28 RX ORDER — CETIRIZINE HYDROCHLORIDE 10 MG/1
10 TABLET ORAL DAILY
COMMUNITY

## 2022-04-28 NOTE — PROGRESS NOTES
Endocrine Progress Note Outpatient     Patient Care Team:  Rama Brooks MD as PCP - General (Family Medicine)    Chief Complaint: Follow-up type 1 diabetes    HPI: 66-year-old female with previous diagnosis of type 1 diabetes, hypertension, hyperlipidemia and hypothyroidism is here for follow-up.    For type 1 diabetes: She is currently on the slim insulin pump with Dexcom monitoring system.  Current basal rates are as follows midnight 0.525 units/h, 7AM 0.6 units per hr.  Insulin carb ratio is 1 unit for each 15 g of carbohydrate at midnight and 1 unit for each 20 g of carbohydrate at 7 AM..  Correction factor is 1 unit for each 85 mg blood sugar with a target blood sugar 110.  She tells me that when she wakes up around 6:00 she does have some low blood sugars and some low blood sugars during the daytime as well.  She has not required any assistance or hospitalization for low or high blood sugar since last seen.    Hypertension: Her blood pressure is fair today.     Hyperlipidemia: Currently on Crestor and Zetia.    Hypothyroidism: On levothyroxine supplementation    Diabetic nephropathy/CKD stage III disease: Follows with nephrology.        Past Medical History:   Diagnosis Date   • Arthritis    • Diabetes mellitus type I (HCC)    • Hyperlipidemia    • Hypertension    • Hyperthyroidism    • Kidney disease    • Neuropathy    • Wrist fracture        Social History     Socioeconomic History   • Marital status: Single   • Number of children: 0   • Years of education: 14   Tobacco Use   • Smoking status: Former Smoker   • Smokeless tobacco: Never Used   Vaping Use   • Vaping Use: Never used   Substance and Sexual Activity   • Alcohol use: Never   • Drug use: Defer   • Sexual activity: Defer       Family History   Problem Relation Age of Onset   • Diabetes Father    • Diabetes Maternal Grandmother        Allergies   Allergen Reactions   • Azithromycin GI Intolerance   • Erythromycin GI Intolerance       ROS:    Constitutional:  Denies fatigue, tiredness.    Eyes:  Denies change in visual acuity   HENT:  Denies nasal congestion or sore throat   Respiratory: denies cough, shortness of breath.   Cardiovascular:  denies chest pain, edema   GI:  Denies abdominal pain, nausea, vomiting.   Musculoskeletal:  Denies back pain or joint pain   Integument:  Denies dry skin and rash   Neurologic:  Denies headache, focal weakness or sensory changes   Endocrine:  Denies polyuria or polydipsia   Psychiatric:  Denies depression or anxiety      Vitals:    04/28/22 1255   BP: 145/75   Pulse: 75   Temp: 97 °F (36.1 °C)   SpO2: 97%     Body mass index is 27.98 kg/m².     Physical Exam:  GEN: NAD, conversant  EYES: EOMI, PERRL, no conjunctival erythema  NECK: no thyromegaly, full ROM   CV: RRR, no murmurs/rubs/gallops, no peripheral edema  LUNG: CTAB, no wheezes/rales/ronchi  SKIN: no rashes, no acanthosis  MSK: Has BL LE edema  NEURO: no tremors, DTR normal  PSYCH: AOX3, appropriate mood, affect normal  FEET: Has bilateral bunions, monofilament test was absent on both feet toes.  Good dorsal pedis pulses on both feet.    Results Review:     I reviewed the patient's new clinical results.      Labs from April 15, 2022 showed a total cholesterol of 217, HDL 61, , triglycerides 133, TSH 38.8 and free T4 0.8.  Urine microalbumin ratio was high at 3797.  A1c is 6.4%.      Medication Review: Reviewed.       Current Outpatient Medications:   •  acetaminophen (TYLENOL) 650 MG 8 hr tablet, Take 650 mg by mouth., Disp: , Rfl:   •  amLODIPine (NORVASC) 10 MG tablet, Take 10 mg by mouth Daily., Disp: , Rfl:   •  Apple Cider Vinegar 500 MG tablet, Take  by mouth., Disp: , Rfl:   •  aspirin 81 MG EC tablet, Take 81 mg by mouth Daily., Disp: , Rfl:   •  B Complex-Biotin-FA (B Complete) tablet, B COMPLETE TABS, Disp: , Rfl:   •  BD Pen Needle Lupe 2nd Gen 32G X 4 MM misc, Pt to use with insulin pens 5 times daily, Disp: 150 each, Rfl: 1  •  Blood  Glucose Monitoring Suppl (ONE TOUCH ULTRA 2) w/Device kit, ONETOUCH ULTRA 2 w/Device KIT, Disp: , Rfl:   •  bumetanide (BUMEX) 0.5 MG tablet, Take 0.5 mg by mouth Daily. 2 tablet twice daily, Disp: , Rfl:   •  calcitriol (ROCALTROL) 0.25 MCG capsule, CALCITRIOL 0.25 MCG CAPS, Disp: , Rfl:   •  cetirizine (zyrTEC) 10 MG tablet, ZYRTEC ALLERGY 10 MG TABS, Disp: , Rfl:   •  cetirizine (zyrTEC) 10 MG tablet, Take 10 mg by mouth., Disp: , Rfl:   •  Cholecalciferol (Vitamin D-3) 25 MCG (1000 UT) capsule, Take 2,000 Units by mouth 2 (two) times a day., Disp: , Rfl:   •  Continuous Blood Gluc  (Dexcom G6 ) device, 1 Device Daily., Disp: 1 each, Rfl: 1  •  Continuous Blood Gluc Sensor (Dexcom G6 Sensor), Every 10 (Ten) Days., Disp: 2 each, Rfl: 6  •  Continuous Blood Gluc Transmit (Dexcom G6 Transmitter) misc, 1 Device Daily., Disp: 1 each, Rfl: 0  •  Cyanocobalamin (Vitamin B-12) 1000 MCG sublingual tablet, Place 1,000 mcg under the tongue Daily., Disp: 100 each, Rfl: 4  •  cyanocobalamin (VITAMIN B-12) 1000 MCG tablet, Take 1,000 mcg by mouth., Disp: , Rfl:   •  ELDERBERRY PO, Take  by mouth., Disp: , Rfl:   •  esomeprazole (nexIUM) 40 MG capsule, , Disp: , Rfl:   •  ezetimibe (ZETIA) 10 MG tablet, Take 10 mg by mouth., Disp: , Rfl:   •  ferrous gluconate (FERGON) 324 MG tablet, Take 324 mg by mouth Daily., Disp: , Rfl:   •  ferrous sulfate 325 (65 FE) MG tablet, Take 325 mg by mouth., Disp: , Rfl:   •  Glucagon (Baqsimi One Pack) 3 MG/DOSE powder, into the nostril(s) as directed by provider., Disp: , Rfl:   •  glucose blood test strip, ONETOUCH ULTRA BLUE STRP, Disp: , Rfl:   •  HumaLOG 100 UNIT/ML injection, INJECT UNDER THE SKIN AS DIRECTED VIA INSULIN PUMP. DO NOT EXCEED 40 UNITS A DAY., Disp: 20 mL, Rfl: 5  •  hydroxychloroquine (PLAQUENIL) 200 MG tablet, Take 200 mg by mouth 2 (Two) Times a Day., Disp: , Rfl:   •  Insulin Lispro, 1 Unit Dial, (HumaLOG KwikPen) 100 UNIT/ML solution pen-injector, INJECT  6 UNITS WITH EACH MEAL ALONG WITH A SLIDING SCALE OF 1 UNIT FOR EACH 50 MG BLOOD SUGAR OVER 150 AT MEALS MAX 50 UNITS PER DAY, Disp: 15 mL, Rfl: 5  •  Lancets (onetouch ultrasoft) lancets, ONETOUCH ULTRASOFT LANCETS, Disp: , Rfl:   •  levothyroxine (SYNTHROID, LEVOTHROID) 137 MCG tablet, Take 1 tablet p.o. daily, Disp: 30 tablet, Rfl: 6  •  losartan (COZAAR) 50 MG tablet, Take 50 mg by mouth Daily., Disp: , Rfl:   •  Multiple Vitamins-Minerals (OCUVITE ADULT 50+ PO), Take  by mouth., Disp: , Rfl:   •  Omega-3 Fatty Acids (fish oil) 1000 MG capsule capsule, Take  by mouth., Disp: , Rfl:   •  ondansetron (ZOFRAN) 4 MG tablet, Take 4 mg by mouth Every 8 (Eight) Hours As Needed for Nausea or Vomiting., Disp: , Rfl:   •  prenatal vitamin (prenatal, CLASSIC, vitamin) tablet, Take  by mouth Daily., Disp: , Rfl:   •  rosuvastatin (Crestor) 10 MG tablet, Take 1 tablet by mouth Every Night., Disp: 30 tablet, Rfl: 11  •  Turmeric 500 MG capsule, Take  by mouth., Disp: , Rfl:   •  vitamin B-6 (PYRIDOXINE) 50 MG tablet, Take 50 mg by mouth Daily., Disp: , Rfl:   •  vitamin D3 125 MCG (5000 UT) capsule capsule, Take  by mouth., Disp: , Rfl:   •  Zinc 50 MG capsule, Take  by mouth., Disp: , Rfl:       Assessment/Plan   1.  Diabetes mellitus type 1: Uncontrolled, she is having some low blood sugars, at this time I will change her basal rate as follows at midnight will be 0.5 units/h, at 7 AM will be 0.575 units/h.  We will follow blood sugars and make further adjustments as needed. m.    2.  Hypertension: Blood pressure better, continue current medications..    3.  Hyperlipidemia: Uncontrolled but improving, her LDL is now at 131.  Continue Crestor and Zetia.    4.  Hypothyroidism: Uncontrolled, TSH high at 38.8, increase levothyroxine to 150 mcg p.o. daily and follow labs.    5.  CKD stage III disease: Follows with Dr. Pires.    6.  Diabetic peripheral neuropathy: She is on Vit D, B12.  She will benefit from diabetic  shoes.    Assessment and plan from October 26, 2021 reviewed and updated.            Jo Mahoney MD FACE.    6/8/2021: Addendum: Patient is checking her blood sugars 4 times daily.

## 2022-04-28 NOTE — PATIENT INSTRUCTIONS
Increase levothyroxine to 137 mcg p.o. daily  Watch for low blood sugars  Always keep glucose source in case of low blood sugar  Continue to work on diet and activity  Annual eye exam and flu vaccine  Labs before follow-up.

## 2022-05-03 ENCOUNTER — TELEPHONE (OUTPATIENT)
Dept: ENDOCRINOLOGY | Facility: CLINIC | Age: 67
End: 2022-05-03

## 2022-05-18 ENCOUNTER — TELEPHONE (OUTPATIENT)
Dept: ENDOCRINOLOGY | Facility: CLINIC | Age: 67
End: 2022-05-18

## 2022-05-18 NOTE — TELEPHONE ENCOUNTER
Tconnect reports scanned into phone note. Pt was recently in hospital with elevated BG's and tests to r/o a MI. Troubleshooted possible causes of BG being elevated. It's possible pt's insulin was spoiled,  or exposed to heat. Pt also c/o more recent pain with pinched nerve and following up with a MD regarding this. Checked pt's settings and they are correct compared to last OV.

## 2022-05-26 ENCOUNTER — TELEPHONE (OUTPATIENT)
Dept: ENDOCRINOLOGY | Facility: CLINIC | Age: 67
End: 2022-05-26

## 2022-05-26 NOTE — TELEPHONE ENCOUNTER
Tconnect reports scanned into phone note.  Called pt and per MD s/o, instructed no changes. Pt has shingles and taking meds for this.

## 2022-06-02 ENCOUNTER — TELEPHONE (OUTPATIENT)
Dept: ENDOCRINOLOGY | Facility: CLINIC | Age: 67
End: 2022-06-02

## 2022-06-02 NOTE — TELEPHONE ENCOUNTER
Scanned into phone note.  Called pt and per MD s/o, instructed pt to change her 12 am basal to 0.475 units per hour.  Pt was able to change while on phone.

## 2022-06-06 ENCOUNTER — TELEPHONE (OUTPATIENT)
Dept: ENDOCRINOLOGY | Facility: CLINIC | Age: 67
End: 2022-06-06

## 2022-06-06 RX ORDER — LEVOTHYROXINE SODIUM 137 UG/1
TABLET ORAL
Qty: 30 TABLET | Refills: 6 | OUTPATIENT
Start: 2022-06-06

## 2022-06-07 PROBLEM — E11.3599 PROLIFERATIVE DIABETIC RETINOPATHY: Status: ACTIVE | Noted: 2022-06-07

## 2022-06-16 ENCOUNTER — TELEPHONE (OUTPATIENT)
Dept: ENDOCRINOLOGY | Facility: CLINIC | Age: 67
End: 2022-06-16

## 2022-07-15 RX ORDER — MAGNESIUM 200 MG
TABLET ORAL
Qty: 100 TABLET | Refills: 3 | Status: SHIPPED | OUTPATIENT
Start: 2022-07-15

## 2022-08-01 ENCOUNTER — TELEPHONE (OUTPATIENT)
Dept: ENDOCRINOLOGY | Facility: CLINIC | Age: 67
End: 2022-08-01

## 2022-08-01 NOTE — TELEPHONE ENCOUNTER
Tconnect reports scanned into phone note. Per MD s/o no changes recommended. Discussed using exercise mode when pt is outside doing yard work.     Pt also stated that she was recently hospitalized and had labs done at that time that indicated her kidney fxn had worsened. Per her doctor, she was told she is now in stage 4 CKD and close to stage 5. She has attended a dialysis class already. She is seeing her doctor tomorrow and will likely find out more info. She will keep us updated.

## 2022-08-05 ENCOUNTER — TELEPHONE (OUTPATIENT)
Dept: ENDOCRINOLOGY | Facility: CLINIC | Age: 67
End: 2022-08-05

## 2022-08-09 ENCOUNTER — TELEPHONE (OUTPATIENT)
Dept: ENDOCRINOLOGY | Facility: CLINIC | Age: 67
End: 2022-08-09

## 2022-08-11 ENCOUNTER — TELEPHONE (OUTPATIENT)
Dept: ENDOCRINOLOGY | Facility: CLINIC | Age: 67
End: 2022-08-11

## 2022-08-12 ENCOUNTER — TELEPHONE (OUTPATIENT)
Dept: ENDOCRINOLOGY | Facility: CLINIC | Age: 67
End: 2022-08-12

## 2022-08-12 NOTE — TELEPHONE ENCOUNTER
Tconnect reports scanned into phone note. Pt did have a low BG today that caused her to pass out. She does have glucagon at home if needed.

## 2022-08-15 ENCOUNTER — TELEPHONE (OUTPATIENT)
Dept: ENDOCRINOLOGY | Facility: CLINIC | Age: 67
End: 2022-08-15

## 2022-08-15 NOTE — TELEPHONE ENCOUNTER
Meg Finnegan MA Craig, Nancy, MA; Hayley Velasco MA  Phone Number: [phone]     Patient called states she gets her pump supplies from [employer]. She said they refused to send her supplies because with medicare she needs to be seen by Dr Mahoney every 3 months.   She states her appt is scheduled for 11/3/22. She wants to know what is she supposed to do.     Thank you

## 2022-08-15 NOTE — TELEPHONE ENCOUNTER
I spoke with patient and she states she just opened her last box of pump supplies. I advised her we will add her to the cancellation list.

## 2022-08-15 NOTE — TELEPHONE ENCOUNTER
Please add her to Dr. Mahoney's cx list for the soonest appt, and please notify pt. Please let me know if she is out of supplies, we may have some samples here we could give her.

## 2022-08-16 NOTE — TELEPHONE ENCOUNTER
"""Annual Type 2 Diabetic eye exam with dilation. No diabetic retinopathy found. Recommend annual dilated examinations. Patient instructed to call office immediately if sudden changes in vision occur. Emphasized importance of good blood glucose control. Summary of care provided to the physician managing the ongoing diabetes care. """ Berto pt and offered appt 8/18 but pt declined stating that she has an appt with her cardiologist in [place]. Pt scheduled 8/22 @ 10:45.

## 2022-08-22 ENCOUNTER — LAB (OUTPATIENT)
Dept: LAB | Facility: HOSPITAL | Age: 67
End: 2022-08-22

## 2022-08-22 ENCOUNTER — TELEPHONE (OUTPATIENT)
Dept: ENDOCRINOLOGY | Facility: CLINIC | Age: 67
End: 2022-08-22

## 2022-08-22 ENCOUNTER — OFFICE VISIT (OUTPATIENT)
Dept: ENDOCRINOLOGY | Facility: CLINIC | Age: 67
End: 2022-08-22

## 2022-08-22 VITALS
DIASTOLIC BLOOD PRESSURE: 82 MMHG | HEIGHT: 62 IN | OXYGEN SATURATION: 98 % | HEART RATE: 80 BPM | WEIGHT: 144 LBS | SYSTOLIC BLOOD PRESSURE: 178 MMHG | BODY MASS INDEX: 26.5 KG/M2

## 2022-08-22 DIAGNOSIS — N18.4 CHRONIC KIDNEY DISEASE (CKD), STAGE IV (SEVERE): ICD-10-CM

## 2022-08-22 DIAGNOSIS — E03.9 ACQUIRED HYPOTHYROIDISM: ICD-10-CM

## 2022-08-22 DIAGNOSIS — E10.65 TYPE 1 DIABETES MELLITUS WITH HYPERGLYCEMIA: Primary | ICD-10-CM

## 2022-08-22 DIAGNOSIS — E11.42 DIABETIC PERIPHERAL NEUROPATHY: ICD-10-CM

## 2022-08-22 DIAGNOSIS — E10.21 TYPE 1 DIABETES MELLITUS WITH DIABETIC NEPHROPATHY: ICD-10-CM

## 2022-08-22 DIAGNOSIS — E78.2 MIXED HYPERLIPIDEMIA: ICD-10-CM

## 2022-08-22 DIAGNOSIS — I10 ESSENTIAL HYPERTENSION: ICD-10-CM

## 2022-08-22 DIAGNOSIS — N18.32 STAGE 3B CHRONIC KIDNEY DISEASE: ICD-10-CM

## 2022-08-22 DIAGNOSIS — E10.65 TYPE 1 DIABETES MELLITUS WITH HYPERGLYCEMIA: ICD-10-CM

## 2022-08-22 PROBLEM — I12.9 BENIGN HYPERTENSIVE RENAL DISEASE: Status: ACTIVE | Noted: 2021-11-19

## 2022-08-22 PROBLEM — M25.473 ANKLE EDEMA: Status: ACTIVE | Noted: 2021-11-19

## 2022-08-22 PROBLEM — R10.9 LEFT FLANK PAIN: Status: ACTIVE | Noted: 2021-11-19

## 2022-08-22 LAB
ALBUMIN SERPL-MCNC: 3.8 G/DL (ref 3.5–5.2)
ALBUMIN UR-MCNC: 103.9 MG/DL
ALBUMIN/GLOB SERPL: 1.5 G/DL
ALP SERPL-CCNC: 72 U/L (ref 39–117)
ALT SERPL W P-5'-P-CCNC: 31 U/L (ref 1–33)
ANION GAP SERPL CALCULATED.3IONS-SCNC: 12.7 MMOL/L (ref 5–15)
AST SERPL-CCNC: 34 U/L (ref 1–32)
BILIRUB SERPL-MCNC: <0.2 MG/DL (ref 0–1.2)
BUN SERPL-MCNC: 35 MG/DL (ref 8–23)
BUN/CREAT SERPL: 11.3 (ref 7–25)
CALCIUM SPEC-SCNC: 10.1 MG/DL (ref 8.6–10.5)
CHLORIDE SERPL-SCNC: 102 MMOL/L (ref 98–107)
CHOLEST SERPL-MCNC: 154 MG/DL (ref 0–200)
CO2 SERPL-SCNC: 25.3 MMOL/L (ref 22–29)
CREAT SERPL-MCNC: 3.1 MG/DL (ref 0.57–1)
CREAT UR-MCNC: 24.6 MG/DL
EGFRCR SERPLBLD CKD-EPI 2021: 15.9 ML/MIN/1.73
GLOBULIN UR ELPH-MCNC: 2.6 GM/DL
GLUCOSE SERPL-MCNC: 59 MG/DL (ref 65–99)
HBA1C MFR BLD: 6.4 % (ref 3.5–5.6)
HDLC SERPL-MCNC: 59 MG/DL (ref 40–60)
LDLC SERPL CALC-MCNC: 77 MG/DL (ref 0–100)
LDLC/HDLC SERPL: 1.29 {RATIO}
MICROALBUMIN/CREAT UR: 4223.6 MG/G
POTASSIUM SERPL-SCNC: 3.7 MMOL/L (ref 3.5–5.2)
PROT SERPL-MCNC: 6.4 G/DL (ref 6–8.5)
SODIUM SERPL-SCNC: 140 MMOL/L (ref 136–145)
T4 FREE SERPL-MCNC: 1.54 NG/DL (ref 0.93–1.7)
TRIGL SERPL-MCNC: 95 MG/DL (ref 0–150)
TSH SERPL DL<=0.05 MIU/L-ACNC: 3.47 UIU/ML (ref 0.27–4.2)
VLDLC SERPL-MCNC: 18 MG/DL (ref 5–40)

## 2022-08-22 PROCEDURE — 82570 ASSAY OF URINE CREATININE: CPT

## 2022-08-22 PROCEDURE — 83036 HEMOGLOBIN GLYCOSYLATED A1C: CPT

## 2022-08-22 PROCEDURE — 80053 COMPREHEN METABOLIC PANEL: CPT

## 2022-08-22 PROCEDURE — 84443 ASSAY THYROID STIM HORMONE: CPT

## 2022-08-22 PROCEDURE — 36415 COLL VENOUS BLD VENIPUNCTURE: CPT

## 2022-08-22 PROCEDURE — 84439 ASSAY OF FREE THYROXINE: CPT

## 2022-08-22 PROCEDURE — 80061 LIPID PANEL: CPT

## 2022-08-22 PROCEDURE — 99214 OFFICE O/P EST MOD 30 MIN: CPT | Performed by: INTERNAL MEDICINE

## 2022-08-22 PROCEDURE — 82043 UR ALBUMIN QUANTITATIVE: CPT

## 2022-08-22 PROCEDURE — 95251 CONT GLUC MNTR ANALYSIS I&R: CPT | Performed by: INTERNAL MEDICINE

## 2022-08-22 RX ORDER — GABAPENTIN 100 MG/1
1 CAPSULE ORAL DAILY
COMMUNITY
Start: 2022-05-20

## 2022-08-22 RX ORDER — TRAMADOL HYDROCHLORIDE 50 MG/1
TABLET ORAL
COMMUNITY
Start: 2022-05-28

## 2022-08-22 RX ORDER — HYDROXYZINE HYDROCHLORIDE 10 MG/1
10 TABLET, FILM COATED ORAL
COMMUNITY
End: 2022-12-13

## 2022-08-22 RX ORDER — HYDROCODONE BITARTRATE AND ACETAMINOPHEN 5; 325 MG/1; MG/1
1 TABLET ORAL EVERY 6 HOURS PRN
COMMUNITY
Start: 2022-05-09

## 2022-08-22 NOTE — PROGRESS NOTES
Endocrine Progress Note Outpatient     Patient Care Team:  Rama Brooks MD as PCP - General (Family Medicine)    Chief Complaint: Follow-up type 1 diabetes    HPI: 67-year-old female with previous diagnosis of type 1 diabetes, hypertension, hyperlipidemia and hypothyroidism is here for follow-up.    For type 1 diabetes: She is currently on the slim insulin pump with Dexcom monitoring system.  Current basal rates are as follows midnight 1.47 units/h, at 7 AM is 0.575 units/h.  Insulin carb ratio at midnight is 1 unit for each 15 g of carbohydrate, at 7 AM is 1 unit for each 20 g of carbohydrate.  Insulin correction factor is 1/4/1985 with a target blood sugar of 110.      He was hospitalized in May 2022 for shingles and high blood sugars.    Hypertension: Blood pressure high.     Hyperlipidemia: Currently on Crestor and Zetia.    Hypothyroidism: On levothyroxine supplementation    Diabetic nephropathy/CKD stage IV disease: Follows with nephrology.        Past Medical History:   Diagnosis Date   • Arthritis    • Diabetes mellitus type I (HCC)    • Hyperlipidemia    • Hypertension    • Hyperthyroidism    • Kidney disease    • Neuropathy    • Wrist fracture        Social History     Socioeconomic History   • Marital status: Single   • Number of children: 0   • Years of education: 14   Tobacco Use   • Smoking status: Former Smoker   • Smokeless tobacco: Never Used   Vaping Use   • Vaping Use: Never used   Substance and Sexual Activity   • Alcohol use: Never   • Drug use: Defer   • Sexual activity: Defer       Family History   Problem Relation Age of Onset   • Diabetes Father    • Diabetes Maternal Grandmother        Allergies   Allergen Reactions   • Azithromycin GI Intolerance   • Erythromycin GI Intolerance       ROS:   Constitutional:  Denies fatigue, tiredness.    Eyes:  Denies change in visual acuity   HENT:  Denies nasal congestion or sore throat   Respiratory: denies cough, shortness of breath.    Cardiovascular:  denies chest pain, edema   GI:  Denies abdominal pain, nausea, vomiting.   Musculoskeletal:  Denies back pain or joint pain   Integument:  Denies dry skin and rash   Neurologic:  Denies headache, focal weakness or sensory changes   Endocrine:  Denies polyuria or polydipsia   Psychiatric:  Denies depression or anxiety      Vitals:    08/22/22 1047   BP: 178/82   Pulse: 80   SpO2: 98%     Body mass index is 26.34 kg/m².     Physical Exam:  GEN: NAD, conversant  EYES: EOMI, PERRL, no conjunctival erythema  NECK: no thyromegaly, full ROM   CV: RRR, no murmurs/rubs/gallops, no peripheral edema  LUNG: CTAB, no wheezes/rales/ronchi  SKIN: no rashes, no acanthosis  MSK: Has BL LE edema  NEURO: no tremors, DTR normal  PSYCH: AOX3, appropriate mood, affect normal  FEET: Has bilateral bunions    Results Review:     I reviewed the patient's new clinical results.    Labs from August 17, 2022 showed a sodium sodium 137, potassium 3.5, chloride 101, CO2 28, BUN 48, creatinine 3.38, glucose 85, calcium 9.3, AST 50, ALT 34, ALP 59.  eGFR was 14.    A1c was 6.4 on July 22, 2022    Dexcom download interpretation: Dates covered was between August 15, 2022 to August 21, 2022.  Average blood sugar was 156.  She is spending 93% in the range, 6% above range and 2% below range.  There was some postprandial hyperglycemia as well as some hypoglycemia in the early afternoon time.    Medication Review: Reviewed.       Current Outpatient Medications:   •  acetaminophen (TYLENOL) 650 MG 8 hr tablet, Take 650 mg by mouth., Disp: , Rfl:   •  amLODIPine (NORVASC) 10 MG tablet, Take 10 mg by mouth Daily., Disp: , Rfl:   •  Apple Cider Vinegar 500 MG tablet, Take  by mouth., Disp: , Rfl:   •  aspirin 81 MG EC tablet, Take 81 mg by mouth Daily., Disp: , Rfl:   •  B Complex-Biotin-FA (B Complete) tablet, B COMPLETE TABS, Disp: , Rfl:   •  bumetanide (BUMEX) 1 MG tablet, Take 1 mg by mouth 2 (Two) Times a Day. 2 tablet twice daily,  Disp: , Rfl:   •  calcitriol (ROCALTROL) 0.25 MCG capsule, CALCITRIOL 0.25 MCG CAPS, Disp: , Rfl:   •  cetirizine (zyrTEC) 10 MG tablet, Take 10 mg by mouth., Disp: , Rfl:   •  Cyanocobalamin (Vitamin B-12) 1000 MCG sublingual tablet, PLACE ONE TABLET UNDER THE TONGUE DAILY, Disp: 100 tablet, Rfl: 3  •  ELDERBERRY PO, Take  by mouth., Disp: , Rfl:   •  esomeprazole (nexIUM) 40 MG capsule, , Disp: , Rfl:   •  ezetimibe (ZETIA) 10 MG tablet, Take 10 mg by mouth., Disp: , Rfl:   •  ferrous sulfate 325 (65 FE) MG tablet, Take 325 mg by mouth., Disp: , Rfl:   •  gabapentin (NEURONTIN) 100 MG capsule, Take 1 capsule by mouth., Disp: , Rfl:   •  HumaLOG 100 UNIT/ML injection, INJECT UNDER THE SKIN AS DIRECTED VIA INSULIN PUMP. DO NOT EXCEED 40 UNITS A DAY., Disp: 20 mL, Rfl: 5  •  HYDROcodone-acetaminophen (NORCO) 5-325 MG per tablet, Take 1 tablet by mouth., Disp: , Rfl:   •  hydroxychloroquine (PLAQUENIL) 200 MG tablet, Take 200 mg by mouth 2 (Two) Times a Day., Disp: , Rfl:   •  hydrOXYzine (ATARAX) 10 MG tablet, Take 10 mg by mouth., Disp: , Rfl:   •  levothyroxine (SYNTHROID, LEVOTHROID) 150 MCG tablet, Take 1 tablet p.o. daily, Disp: 90 tablet, Rfl: 4  •  Multiple Vitamins-Minerals (OCUVITE ADULT 50+ PO), Take  by mouth., Disp: , Rfl:   •  ondansetron (ZOFRAN) 4 MG tablet, Take 4 mg by mouth Every 8 (Eight) Hours As Needed for Nausea or Vomiting., Disp: , Rfl:   •  prenatal vitamin (prenatal, CLASSIC, vitamin) tablet, Take  by mouth Daily., Disp: , Rfl:   •  rosuvastatin (Crestor) 10 MG tablet, Take 1 tablet by mouth Every Night., Disp: 30 tablet, Rfl: 11  •  traMADol (ULTRAM) 50 MG tablet, Take 1 tablet by mouth., Disp: , Rfl:   •  Turmeric 500 MG capsule, Take  by mouth., Disp: , Rfl:   •  vitamin D3 125 MCG (5000 UT) capsule capsule, Take  by mouth., Disp: , Rfl:   •  Zinc 50 MG capsule, Take  by mouth., Disp: , Rfl:   •  BD Pen Needle Lupe 2nd Gen 32G X 4 MM misc, Pt to use with insulin pens 5 times daily, Disp:  150 each, Rfl: 1  •  Blood Glucose Monitoring Suppl (ONE TOUCH ULTRA 2) w/Device kit, ONETOUCH ULTRA 2 w/Device KIT, Disp: , Rfl:   •  Continuous Blood Gluc  (Dexcom G6 ) device, 1 Device Daily., Disp: 1 each, Rfl: 1  •  Continuous Blood Gluc Sensor (Dexcom G6 Sensor), Every 10 (Ten) Days., Disp: 2 each, Rfl: 6  •  Continuous Blood Gluc Transmit (Dexcom G6 Transmitter) misc, 1 Device Daily., Disp: 1 each, Rfl: 0  •  Glucagon (Baqsimi One Pack) 3 MG/DOSE powder, into the nostril(s) as directed by provider., Disp: , Rfl:   •  glucose blood test strip, ONETOUCH ULTRA BLUE STRP, Disp: , Rfl:   •  Lancets (onetouch ultrasoft) lancets, ONETOUCH ULTRASOFT LANCETS, Disp: , Rfl:   •  vitamin B-6 (PYRIDOXINE) 50 MG tablet, Take 50 mg by mouth Daily., Disp: , Rfl:       Assessment & Plan   1.  Diabetes mellitus type 1 with Hyperglycemia: Uncontrolled with high and low blood sugars.  I have changed her basal rates as follows midnight will be 0.47 units/h, 7 AM will be 0.52 units/h.  Insulin carb ratio will be 1 unit for each 15 g of carb at at midnight and 1 unit for each 18 g of carbs at 7 AM.  She is advised to keep glucose source in case of low blood sugars.  We will follow blood sugars and make further adjustments as needed. m.    2.  Hypertension: Uncontrolled, she is following with her nephrologist.  She is off losartan by the nephrologist.  She is currently on amlodipine.    3.  Hyperlipidemia: On Crestor and Zetia.    4.  Hypothyroidism: Levothyroxine, will follow TSH and free T4.    5.  CKD stage IV disease: Follows with Dr. Pires.    6.  Diabetic peripheral neuropathy: She is on Vit D, B12.  She will benefit from diabetic shoes.      Assessment and plan from April 28, 2022 reviewed and updated.            Jo Mahoney MD FACE.    6/8/2021: Addendum: Patient is checking her blood sugars 4 times daily.

## 2022-08-22 NOTE — PATIENT INSTRUCTIONS
Watch for low blood sugars  Always keep glucose source in case of low blood sugars  Annual eye exam  TSh and free T4 today and rest of labs in November.

## 2022-09-01 ENCOUNTER — OFFICE VISIT (OUTPATIENT)
Dept: FAMILY MEDICINE CLINIC | Age: 67
End: 2022-09-01

## 2022-09-01 VITALS
SYSTOLIC BLOOD PRESSURE: 142 MMHG | OXYGEN SATURATION: 94 % | WEIGHT: 151.8 LBS | BODY MASS INDEX: 27.94 KG/M2 | HEART RATE: 69 BPM | RESPIRATION RATE: 16 BRPM | DIASTOLIC BLOOD PRESSURE: 58 MMHG | HEIGHT: 62 IN

## 2022-09-01 DIAGNOSIS — R60.0 PEDAL EDEMA: ICD-10-CM

## 2022-09-01 DIAGNOSIS — N18.5 CKD (CHRONIC KIDNEY DISEASE) STAGE 5, GFR LESS THAN 15 ML/MIN: ICD-10-CM

## 2022-09-01 DIAGNOSIS — I10 BENIGN HYPERTENSION: Primary | ICD-10-CM

## 2022-09-01 DIAGNOSIS — R06.01 ORTHOPNEA: ICD-10-CM

## 2022-09-01 PROCEDURE — 99215 OFFICE O/P EST HI 40 MIN: CPT | Performed by: FAMILY MEDICINE

## 2022-09-01 NOTE — ASSESSMENT & PLAN NOTE
MDM: Renal condition is worsening. Anuria with increased pedal edema, orthopnea, cough, chest heaviness. Go to the ER now to assess for pulmonary edema, congestive heart failure, need of IV diuresis

## 2022-09-01 NOTE — PROGRESS NOTES
"Na Handy presents to [facility]      Chief Complaint  Cor    HPI Chronic kidney disease stage 5.  In the care of Dr. Pires. Renal dialysis port placement in the left upper arm on 8/24/22 by Dr. Viera.  Minimal to nil urine output.  Increasing shortness of air.  \"I feel there's fluid on my chest.  I cough and nothing comes out.\" Using Incentive Spirometer to encourage deep breathing, post-op proceedure    Respiratory:  SOA:  no. Exertional dyspnea: yes.  Dyspnea at rest: no.  Flights of stairs climbable: 0  Cardiology:  Chest pain: no.  Dizziness: no. Easily fatigued: no.  Pedal edema: no.  Light-headiness: no.  Symptoms of claudication: no.  Orthopnea: yes  Palpitations: no. Tachycardia: no.  Ophthalmology:  Last eye exam date:6/2022. Vision changes: none.  Gastroenterology:  Heartburn: no.  Nausea: no.      Current Outpatient Medications:   •  acetaminophen (TYLENOL) 650 MG 8 hr tablet, Take 650 mg by mouth., Disp: , Rfl:   •  amLODIPine (NORVASC) 10 MG tablet, Take 10 mg by mouth Daily., Disp: , Rfl:   •  Apple Cider Vinegar 500 MG tablet, Take  by mouth., Disp: , Rfl:   •  aspirin 81 MG EC tablet, Take 81 mg by mouth Daily., Disp: , Rfl:   •  B Complex-Biotin-FA (B Complete) tablet, B COMPLETE TABS, Disp: , Rfl:   •  BD Pen Needle Lupe 2nd Gen 32G X 4 MM misc, Pt to use with insulin pens 5 times daily, Disp: 150 each, Rfl: 1  •  Blood Glucose Monitoring Suppl (ONE TOUCH ULTRA 2) w/Device kit, ONETOUCH ULTRA 2 w/Device KIT, Disp: , Rfl:   •  bumetanide (BUMEX) 1 MG tablet, Take 1 mg by mouth 2 (Two) Times a Day. 2 tablet twice daily, Disp: , Rfl:   •  calcitriol (ROCALTROL) 0.25 MCG capsule, CALCITRIOL 0.25 MCG CAPS, Disp: , Rfl:   •  cetirizine (zyrTEC) 10 MG tablet, Take 10 mg by mouth., Disp: , Rfl:   •  Continuous Blood Gluc  (Dexcom G6 ) device, 1 Device Daily., Disp: 1 each, Rfl: 1  •  Continuous Blood Gluc Sensor (Dexcom G6 Sensor), Every 10 (Ten) " Days., Disp: 2 each, Rfl: 6  •  Continuous Blood Gluc Transmit (Dexcom G6 Transmitter) misc, 1 Device Daily., Disp: 1 each, Rfl: 0  •  Cyanocobalamin (Vitamin B-12) 1000 MCG sublingual tablet, PLACE ONE TABLET UNDER THE TONGUE DAILY, Disp: 100 tablet, Rfl: 3  •  ELDERBERRY PO, Take  by mouth., Disp: , Rfl:   •  esomeprazole (nexIUM) 40 MG capsule, , Disp: , Rfl:   •  ezetimibe (ZETIA) 10 MG tablet, Take 10 mg by mouth., Disp: , Rfl:   •  ferrous sulfate 325 (65 FE) MG tablet, Take 325 mg by mouth., Disp: , Rfl:   •  gabapentin (NEURONTIN) 100 MG capsule, Take 1 capsule by mouth., Disp: , Rfl:   •  Glucagon (Baqsimi One Pack) 3 MG/DOSE powder, into the nostril(s) as directed by provider., Disp: , Rfl:   •  glucose blood test strip, ONETOUCH ULTRA BLUE STRP, Disp: , Rfl:   •  HumaLOG 100 UNIT/ML injection, INJECT UNDER THE SKIN AS DIRECTED VIA INSULIN PUMP. DO NOT EXCEED 40 UNITS A DAY., Disp: 20 mL, Rfl: 5  •  HYDROcodone-acetaminophen (NORCO) 5-325 MG per tablet, Take 1 tablet by mouth., Disp: , Rfl:   •  hydroxychloroquine (PLAQUENIL) 200 MG tablet, Take 200 mg by mouth 2 (Two) Times a Day., Disp: , Rfl:   •  hydrOXYzine (ATARAX) 10 MG tablet, Take 10 mg by mouth., Disp: , Rfl:   •  Lancets (onetouch ultrasoft) lancets, ONETOUCH ULTRASOFT LANCETS, Disp: , Rfl:   •  levothyroxine (SYNTHROID, LEVOTHROID) 150 MCG tablet, Take 1 tablet p.o. daily, Disp: 90 tablet, Rfl: 4  •  Multiple Vitamins-Minerals (OCUVITE ADULT 50+ PO), Take  by mouth., Disp: , Rfl:   •  ondansetron (ZOFRAN) 4 MG tablet, Take 4 mg by mouth Every 8 (Eight) Hours As Needed for Nausea or Vomiting., Disp: , Rfl:   •  prenatal vitamin (prenatal, CLASSIC, vitamin) tablet, Take  by mouth Daily., Disp: , Rfl:   •  rosuvastatin (Crestor) 10 MG tablet, Take 1 tablet by mouth Every Night., Disp: 30 tablet, Rfl: 11  •  traMADol (ULTRAM) 50 MG tablet, Take 1 tablet by mouth., Disp: , Rfl:   •  Turmeric 500 MG capsule, Take  by mouth., Disp: , Rfl:   •  vitamin  "D3 125 MCG (5000 UT) capsule capsule, Take  by mouth., Disp: , Rfl:   •  Zinc 50 MG capsule, Take  by mouth., Disp: , Rfl:      Allergies   Allergen Reactions   • Azithromycin GI Intolerance   • Erythromycin GI Intolerance        Past Medical History:   Diagnosis Date   • Arthritis    • Diabetes mellitus type I (HCC)    • Hyperlipidemia    • Hypertension    • Hyperthyroidism    • Kidney disease    • Neuropathy    • Wrist fracture        Past Surgical History:   Procedure Laterality Date   • HYSTERECTOMY     • SINUS SURGERY     • TONSILLECTOMY          Family History   Problem Relation Age of Onset   • Diabetes Father    • Diabetes Maternal Grandmother         Social History     Socioeconomic History   • Marital status: Single   • Number of children: 0   • Years of education: 14   Tobacco Use   • Smoking status: Former Smoker   • Smokeless tobacco: Never Used   Vaping Use   • Vaping Use: Never used   Substance and Sexual Activity   • Alcohol use: Never   • Drug use: Defer   • Sexual activity: Defer        ROS: As in HPI      Objective   Vital Signs:  There were no vitals taken for this visit.  Estimated body mass index is 26.34 kg/m² as calculated from the following:    Height as of 8/22/22: 157.5 cm (62\").    Weight as of 8/22/22: 65.3 kg (144 lb).        Physical Exam  Constitutional:       Comments: No acute distress. Well kept. Pleasant, cheerful, relaxed, interactive, coherant   Neck:      Vascular: No JVD.   Cardiovascular:      Rate and Rhythm: Normal rate and regular rhythm.      Pulses:           Carotid pulses are 2+ on the right side and 2+ on the left side.       Radial pulses are 2+ on the right side and 2+ on the left side.        Femoral pulses are 0 on the right side and 0 on the left side.       Popliteal pulses are 2+ on the right side and 2+ on the left side.        Dorsalis pedis pulses are 2+ on the right side and 2+ on the left side.        Posterior tibial pulses are 2+ on the right side and 2+ " on the left side.      Heart sounds: Normal heart sounds.      Comments: No lower extremity varicosities  Pulmonary:      Effort: Pulmonary effort is normal.      Comments: Slight coarseness in the right lung field.  No egophony or dullness to percussion.  No rales  Musculoskeletal:      Right lower le+ Edema present.      Left lower le+ Edema present.   Skin:     Comments: No dystrophic toenails. No integument changes of the shins or feet.  Toes are warm and pink         Result Review :                    Assessment and Plan   Diagnoses and all orders for this visit:    1. Benign hypertension (Primary)    2. CKD (chronic kidney disease) stage 5, GFR less than 15 ml/min (HCC)  Assessment & Plan:  MDM: Renal condition is worsening. Anuria with increased pedal edema, orthopnea, cough, chest heaviness. Go to the ER now to assess for pulmonary edema, congestive heart failure, need of IV diuresis      3. Pedal edema    4. Orthopnea           Follow Up   No follow-ups on file.    Patient was given instructions and counseling regarding her condition or for health maintenance advice. Please see specific information pulled into the AVS if appropriate.   There are no Patient Instructions on file for this visit.

## 2022-09-12 NOTE — TELEPHONE ENCOUNTER
Rx Refill Note  Requested Prescriptions     Pending Prescriptions Disp Refills   • calcitriol (ROCALTROL) 0.25 MCG capsule [Pharmacy Med Name: CALCITRIOL 0.25 MCG CAPSULE] 90 capsule      Sig: TAKE ONE CAPSULE BY MOUTH DAILY      Last office visit with prescribing clinician: 9/1/2022      Next office visit with prescribing clinician: 12/1/2022            Luz Arriola MA  09/12/22, 08:34 EDT

## 2022-09-13 RX ORDER — CALCITRIOL 0.25 UG/1
CAPSULE, LIQUID FILLED ORAL
Qty: 90 CAPSULE | Refills: 0 | Status: SHIPPED | OUTPATIENT
Start: 2022-09-13

## 2022-10-07 ENCOUNTER — HOSPITAL ENCOUNTER (OUTPATIENT)
Dept: INTERVENTIONAL RADIOLOGY/VASCULAR | Facility: HOSPITAL | Age: 67
Discharge: HOME | End: 2022-10-07
Admitting: INTERNAL MEDICINE

## 2022-10-07 VITALS
OXYGEN SATURATION: 96 % | HEART RATE: 69 BPM | DIASTOLIC BLOOD PRESSURE: 64 MMHG | TEMPERATURE: 98.1 F | BODY MASS INDEX: 27.79 KG/M2 | RESPIRATION RATE: 13 BRPM | WEIGHT: 151 LBS | SYSTOLIC BLOOD PRESSURE: 147 MMHG | HEIGHT: 62 IN

## 2022-10-07 DIAGNOSIS — N18.5: ICD-10-CM

## 2022-10-07 DIAGNOSIS — N18.5 CHRONIC KIDNEY DISEASE, STAGE V: ICD-10-CM

## 2022-10-07 DIAGNOSIS — I12.0: ICD-10-CM

## 2022-10-07 LAB
APTT PPP: 25.8 SECONDS (ref 24–31)
BASOPHILS # BLD AUTO: 0.1 10*3/MM3 (ref 0–0.2)
BASOPHILS NFR BLD AUTO: 0.7 % (ref 0–1.5)
DEPRECATED RDW RBC AUTO: 46.4 FL (ref 37–54)
EOSINOPHIL # BLD AUTO: 0 10*3/MM3 (ref 0–0.4)
EOSINOPHIL NFR BLD AUTO: 0 % (ref 0.3–6.2)
ERYTHROCYTE [DISTWIDTH] IN BLOOD BY AUTOMATED COUNT: 14.1 % (ref 12.3–15.4)
HCT VFR BLD AUTO: 32.3 % (ref 34–46.6)
HGB BLD-MCNC: 10.5 G/DL (ref 12–15.9)
INR PPP: 1.09 (ref 0.93–1.1)
LYMPHOCYTES # BLD AUTO: 1.5 10*3/MM3 (ref 0.7–3.1)
LYMPHOCYTES NFR BLD AUTO: 14.9 % (ref 19.6–45.3)
MCH RBC QN AUTO: 30.5 PG (ref 26.6–33)
MCHC RBC AUTO-ENTMCNC: 32.6 G/DL (ref 31.5–35.7)
MCV RBC AUTO: 93.6 FL (ref 79–97)
MONOCYTES # BLD AUTO: 0.8 10*3/MM3 (ref 0.1–0.9)
MONOCYTES NFR BLD AUTO: 8.3 % (ref 5–12)
NEUTROPHILS NFR BLD AUTO: 7.5 10*3/MM3 (ref 1.7–7)
NEUTROPHILS NFR BLD AUTO: 76.1 % (ref 42.7–76)
NRBC BLD AUTO-RTO: 0 /100 WBC (ref 0–0.2)
PLATELET # BLD AUTO: 328 10*3/MM3 (ref 140–450)
PMV BLD AUTO: 7.5 FL (ref 6–12)
PROTHROMBIN TIME: 11.2 SECONDS (ref 9.6–11.7)
RBC # BLD AUTO: 3.45 10*6/MM3 (ref 3.77–5.28)
WBC NRBC COR # BLD: 9.9 10*3/MM3 (ref 3.4–10.8)

## 2022-10-07 PROCEDURE — 25010000002 HEPARIN (PORCINE) PER 1000 UNITS: Performed by: RADIOLOGY

## 2022-10-07 PROCEDURE — 25010000002 MIDAZOLAM PER 1 MG: Performed by: RADIOLOGY

## 2022-10-07 PROCEDURE — C1894 INTRO/SHEATH, NON-LASER: HCPCS

## 2022-10-07 PROCEDURE — 99152 MOD SED SAME PHYS/QHP 5/>YRS: CPT

## 2022-10-07 PROCEDURE — 0 LIDOCAINE 1 % SOLUTION: Performed by: RADIOLOGY

## 2022-10-07 PROCEDURE — 25010000002 CEFAZOLIN PER 500 MG: Performed by: RADIOLOGY

## 2022-10-07 PROCEDURE — C1750 CATH, HEMODIALYSIS,LONG-TERM: HCPCS

## 2022-10-07 PROCEDURE — 25010000002 FENTANYL CITRATE (PF) 50 MCG/ML SOLUTION: Performed by: RADIOLOGY

## 2022-10-07 PROCEDURE — 76937 US GUIDE VASCULAR ACCESS: CPT

## 2022-10-07 PROCEDURE — 25010000002 ONDANSETRON PER 1 MG: Performed by: RADIOLOGY

## 2022-10-07 PROCEDURE — 85730 THROMBOPLASTIN TIME PARTIAL: CPT | Performed by: RADIOLOGY

## 2022-10-07 PROCEDURE — 36558 INSERT TUNNELED CV CATH: CPT

## 2022-10-07 PROCEDURE — 85610 PROTHROMBIN TIME: CPT | Performed by: RADIOLOGY

## 2022-10-07 PROCEDURE — 85025 COMPLETE CBC W/AUTO DIFF WBC: CPT | Performed by: RADIOLOGY

## 2022-10-07 PROCEDURE — 77001 FLUOROGUIDE FOR VEIN DEVICE: CPT

## 2022-10-07 RX ORDER — LIDOCAINE HYDROCHLORIDE 10 MG/ML
INJECTION, SOLUTION INFILTRATION; PERINEURAL
Status: COMPLETED | OUTPATIENT
Start: 2022-10-07 | End: 2022-10-07

## 2022-10-07 RX ORDER — HEPARIN SODIUM 1000 [USP'U]/ML
INJECTION, SOLUTION INTRAVENOUS; SUBCUTANEOUS
Status: COMPLETED | OUTPATIENT
Start: 2022-10-07 | End: 2022-10-07

## 2022-10-07 RX ORDER — MIDAZOLAM HYDROCHLORIDE 1 MG/ML
INJECTION INTRAMUSCULAR; INTRAVENOUS
Status: COMPLETED | OUTPATIENT
Start: 2022-10-07 | End: 2022-10-07

## 2022-10-07 RX ORDER — SODIUM CHLORIDE 9 MG/ML
75 INJECTION, SOLUTION INTRAVENOUS CONTINUOUS
Status: DISCONTINUED | OUTPATIENT
Start: 2022-10-07 | End: 2022-10-09 | Stop reason: HOSPADM

## 2022-10-07 RX ORDER — SODIUM CHLORIDE 0.9 % (FLUSH) 0.9 %
3 SYRINGE (ML) INJECTION EVERY 12 HOURS SCHEDULED
Status: DISCONTINUED | OUTPATIENT
Start: 2022-10-07 | End: 2022-10-09 | Stop reason: HOSPADM

## 2022-10-07 RX ORDER — ONDANSETRON 2 MG/ML
INJECTION INTRAMUSCULAR; INTRAVENOUS
Status: COMPLETED | OUTPATIENT
Start: 2022-10-07 | End: 2022-10-07

## 2022-10-07 RX ORDER — FENTANYL CITRATE 50 UG/ML
INJECTION, SOLUTION INTRAMUSCULAR; INTRAVENOUS
Status: COMPLETED | OUTPATIENT
Start: 2022-10-07 | End: 2022-10-07

## 2022-10-07 RX ADMIN — FENTANYL CITRATE 100 MCG: 50 INJECTION, SOLUTION INTRAMUSCULAR; INTRAVENOUS at 12:42

## 2022-10-07 RX ADMIN — MIDAZOLAM 1 MG: 1 INJECTION INTRAMUSCULAR; INTRAVENOUS at 12:42

## 2022-10-07 RX ADMIN — ONDANSETRON 4 MG: 2 INJECTION INTRAMUSCULAR; INTRAVENOUS at 12:36

## 2022-10-07 RX ADMIN — CEFAZOLIN SODIUM 1 G: 1 INJECTION, POWDER, FOR SOLUTION INTRAMUSCULAR; INTRAVENOUS at 12:20

## 2022-10-07 RX ADMIN — LIDOCAINE HYDROCHLORIDE 3 ML: 10 INJECTION, SOLUTION INFILTRATION; PERINEURAL at 12:45

## 2022-10-07 RX ADMIN — LIDOCAINE HYDROCHLORIDE 5 ML: 10 INJECTION, SOLUTION INFILTRATION; PERINEURAL at 12:50

## 2022-10-07 RX ADMIN — HEPARIN SODIUM 4100 UNITS: 1000 INJECTION, SOLUTION INTRAVENOUS; SUBCUTANEOUS at 13:08

## 2022-10-07 NOTE — NURSING NOTE
Pt's right IJ vein confirmed patent using ultrasound guidance by ARMANDO Rodney. Right neck and chest then prepped in sterile fashion using chlorhexidine scrub.

## 2022-10-07 NOTE — NURSING NOTE
Pt was brought to IR dept via stretcher, on room air, with face mask in place. Pt is alert and oriented x4. Pt skin is flesh, warm, and dry. Pt is relaxed and cooperative. Pt is on cardiac monitoring. Pt was educated on longterm hemodialysis catheter placement along with medications used for procedure and voiced understanding.

## 2022-10-07 NOTE — NURSING NOTE
Dr. Menendez has successful access to pt's right IJ vein, using ultrasound guidance and verified under fluoroscopy. Procedure continues.

## 2022-10-07 NOTE — NURSING NOTE
Dermabond applied to pt's right neck puncture site. Local dressing of biopatch and sorbaview applied to pt's right chest dialysis catheter site. Dressing is labeled, dry and intact.

## 2022-10-07 NOTE — NURSING NOTE
Pt moved self from stretcher into supine position on IR procedure table. Pt remains on cardiac monitoring and was placed on 2L oxygen via N/C for conscious sedation procedure.

## 2022-10-07 NOTE — NURSING NOTE
Dr. Menendez successfully placed a 19 cm, tip to cuff, longterm hemodialysis catheter in pt's right chest using right IJ vein access. Lot # [id number]  Dr. Menendez verified proper placement using fluoroscopy imaging and both lumens flush easily and give good blood return. Dr. Menendez is now suturing catheter in place on pt's right chest.

## 2022-10-07 NOTE — NURSING NOTE
Dialysis catheter lumens heparinized per protocol and then green sterile caps applied prior to lumens being wrapped in gauze with coban. Dialysis catheter is ready for use per Dr. Menendez.    How Severe Is Your Dry Skin?: mild Additional History: Patient is currently breast feeding(10 months). Patient notes baby daughter has had thrush in past. Mom and baby have both been treated, pt most recently for 2 wks of diflucan while both treated with topicals. Pt is working with lactation specialists, as well as peds,gyn. Pt notes she is unable to use nipple shields, as infant does not tolerate them. Patient denies any s/sx of Belkis's

## 2022-10-07 NOTE — NURSING NOTE
Pt moved self back onto her stretcher laying semi-fowlers. Pt is stable on room air and remains on cardiac monitoring.

## 2022-10-26 RX ORDER — AMLODIPINE BESYLATE 10 MG/1
1 TABLET ORAL DAILY
COMMUNITY
End: 2022-12-13 | Stop reason: HOSPADM

## 2022-10-26 RX ORDER — GABAPENTIN 100 MG/1
100 CAPSULE ORAL
COMMUNITY
End: 2022-11-03

## 2022-10-26 RX ORDER — BUMETANIDE 2 MG/1
TABLET ORAL
COMMUNITY
Start: 2022-09-12

## 2022-10-26 RX ORDER — TRAMADOL HYDROCHLORIDE 50 MG/1
1 TABLET ORAL
COMMUNITY
Start: 2022-08-01 | End: 2022-11-03

## 2022-11-03 ENCOUNTER — OFFICE VISIT (OUTPATIENT)
Dept: ENDOCRINOLOGY | Facility: CLINIC | Age: 67
End: 2022-11-03

## 2022-11-03 VITALS
OXYGEN SATURATION: 97 % | HEART RATE: 62 BPM | WEIGHT: 126 LBS | BODY MASS INDEX: 23.19 KG/M2 | DIASTOLIC BLOOD PRESSURE: 70 MMHG | HEIGHT: 62 IN | SYSTOLIC BLOOD PRESSURE: 130 MMHG

## 2022-11-03 DIAGNOSIS — E78.2 MIXED HYPERLIPIDEMIA: ICD-10-CM

## 2022-11-03 DIAGNOSIS — E11.42 DIABETIC PERIPHERAL NEUROPATHY: ICD-10-CM

## 2022-11-03 DIAGNOSIS — I10 BENIGN HYPERTENSION: ICD-10-CM

## 2022-11-03 DIAGNOSIS — E10.65 TYPE 1 DIABETES MELLITUS WITH HYPERGLYCEMIA: Primary | ICD-10-CM

## 2022-11-03 DIAGNOSIS — E03.9 ACQUIRED HYPOTHYROIDISM: ICD-10-CM

## 2022-11-03 PROBLEM — K21.9 GERD (GASTROESOPHAGEAL REFLUX DISEASE): Status: ACTIVE | Noted: 2022-07-29

## 2022-11-03 PROCEDURE — 99214 OFFICE O/P EST MOD 30 MIN: CPT | Performed by: INTERNAL MEDICINE

## 2022-11-03 NOTE — PROGRESS NOTES
Endocrine Progress Note Outpatient     Patient Care Team:  Rama Brooks MD as PCP - General (Family Medicine)    Chief Complaint: Follow-up type 1 diabetes    HPI: 67-year-old female with previous diagnosis of type 1 diabetes, hypertension, hyperlipidemia and hypothyroidism is here for follow-up.    For type 1 diabetes: She is currently on the slim insulin pump with Dexcom monitoring system.  Current basal rates are as follows midnight 0.47 units/h, at 7 AM is 0.52 units/h.  Insulin carb ratio at midnight is 1 unit for each 15 g of carbohydrate, at 7 AM is 1 unit for each 18 g of carbohydrate.  Insulin correction factor is 1 unit for each 85 mg BS with a target blood sugar of 110.      Hypertension: Blood pressure well controoled.     Hyperlipidemia: Currently on Crestor and Zetia.    Hypothyroidism: On levothyroxine supplementation    End-stage renal disease: On hemodialysis now since 10/2022.         Past Medical History:   Diagnosis Date   • Arthritis    • Diabetes mellitus type I (HCC)    • Hyperlipidemia    • Hypertension    • Hyperthyroidism    • Kidney disease    • Neuropathy    • Wrist fracture        Social History     Socioeconomic History   • Marital status: Single   • Number of children: 0   • Years of education: 14   Tobacco Use   • Smoking status: Former   • Smokeless tobacco: Never   Vaping Use   • Vaping Use: Never used   Substance and Sexual Activity   • Alcohol use: Never   • Drug use: Defer   • Sexual activity: Defer       Family History   Problem Relation Age of Onset   • Diabetes Father    • Diabetes Maternal Grandmother        Allergies   Allergen Reactions   • Azithromycin GI Intolerance and Other (See Comments)     nausea  nausea     • Adhesive Tape Rash       ROS:   Constitutional:  Denies fatigue, tiredness.    Eyes:  Denies change in visual acuity   HENT:  Denies nasal congestion or sore throat   Respiratory: denies cough, shortness of breath.   Cardiovascular:  denies chest pain,  edema   GI:  Denies abdominal pain, nausea, vomiting.   Musculoskeletal:  Denies back pain or joint pain   Integument:  Denies dry skin and rash   Neurologic:  Denies headache, focal weakness or sensory changes   Endocrine:  Denies polyuria or polydipsia   Psychiatric:  Denies depression or anxiety      Vitals:    11/03/22 1300   BP: 130/70   Pulse: 62   SpO2: 97%     Body mass index is 23.05 kg/m².     Physical Exam:  GEN: NAD, conversant  EYES: EOMI, PERRL, no conjunctival erythema  NECK: no thyromegaly, full ROM   CV: RRR, no murmurs/rubs/gallops, no peripheral edema  LUNG: CTAB, no wheezes/rales/ronchi  SKIN: no rashes, no acanthosis  MSK: Has BL LE edema  NEURO: no tremors, DTR normal  PSYCH: AOX3, appropriate mood, affect normal  FEET: Has bilateral bunions    Results Review:     I reviewed the patient's new clinical results.    Labs from October 2022 showed an A1c of 5.4% sodium 136, potassium 4.3, BUN 31 bicarb was 2647, total cholesterol 123, triglycerides 159 and LDL was 48.    Medication Review: Reviewed.       Current Outpatient Medications:   •  acetaminophen (TYLENOL) 650 MG 8 hr tablet, Take 650 mg by mouth., Disp: , Rfl:   •  amLODIPine (NORVASC) 10 MG tablet, Take 1 tablet by mouth Daily., Disp: , Rfl:   •  Apple Cider Vinegar 500 MG tablet, Take  by mouth., Disp: , Rfl:   •  ascorbic acid (VITAMIN C) 1000 MG tablet, Take 1 tablet by mouth., Disp: , Rfl:   •  aspirin 81 MG EC tablet, Take 81 mg by mouth Daily., Disp: , Rfl:   •  B Complex-Biotin-FA (B Complete) tablet, B COMPLETE TABS, Disp: , Rfl:   •  BD Pen Needle Lupe 2nd Gen 32G X 4 MM misc, Pt to use with insulin pens 5 times daily, Disp: 150 each, Rfl: 1  •  Blood Glucose Monitoring Suppl (ONE TOUCH ULTRA 2) w/Device kit, ONETOUCH ULTRA 2 w/Device KIT, Disp: , Rfl:   •  bumetanide (BUMEX) 2 MG tablet, See administration instructions. 4 mg in the AM and 2 mg in the PM, Disp: , Rfl:   •  calcitriol (ROCALTROL) 0.25 MCG capsule, TAKE ONE CAPSULE  BY MOUTH DAILY, Disp: 90 capsule, Rfl: 0  •  cetirizine (zyrTEC) 10 MG tablet, Take 10 mg by mouth., Disp: , Rfl:   •  Cholecalciferol 50 MCG (2000 UT) capsule, Take 1 tablet by mouth 2 (Two) Times a Day., Disp: , Rfl:   •  Continuous Blood Gluc  (Dexcom G6 ) device, 1 Device Daily., Disp: 1 each, Rfl: 1  •  Continuous Blood Gluc Sensor (Dexcom G6 Sensor), Every 10 (Ten) Days., Disp: 2 each, Rfl: 6  •  Continuous Blood Gluc Transmit (Dexcom G6 Transmitter) misc, 1 Device Daily., Disp: 1 each, Rfl: 0  •  Cyanocobalamin (Vitamin B-12) 1000 MCG sublingual tablet, PLACE ONE TABLET UNDER THE TONGUE DAILY, Disp: 100 tablet, Rfl: 3  •  ELDERBERRY PO, Take  by mouth., Disp: , Rfl:   •  esomeprazole (nexIUM) 40 MG capsule, , Disp: , Rfl:   •  ezetimibe (ZETIA) 10 MG tablet, Take 10 mg by mouth., Disp: , Rfl:   •  ferrous sulfate 325 (65 FE) MG tablet, Take 325 mg by mouth., Disp: , Rfl:   •  gabapentin (NEURONTIN) 100 MG capsule, Take 1 capsule by mouth., Disp: , Rfl:   •  Glucagon (Baqsimi One Pack) 3 MG/DOSE powder, into the nostril(s) as directed by provider., Disp: , Rfl:   •  glucose blood test strip, ONETOUCH ULTRA BLUE STRP, Disp: , Rfl:   •  HumaLOG 100 UNIT/ML injection, INJECT UNDER THE SKIN AS DIRECTED VIA INSULIN PUMP. DO NOT EXCEED 40 UNITS A DAY., Disp: 20 mL, Rfl: 5  •  HYDROcodone-acetaminophen (NORCO) 5-325 MG per tablet, Take 1 tablet by mouth., Disp: , Rfl:   •  hydroxychloroquine (PLAQUENIL) 200 MG tablet, Take 200 mg by mouth 2 (Two) Times a Day., Disp: , Rfl:   •  hydrOXYzine (ATARAX) 10 MG tablet, Take 10 mg by mouth., Disp: , Rfl:   •  Lancets (onetouch ultrasoft) lancets, ONETOUCH ULTRASOFT LANCETS, Disp: , Rfl:   •  levothyroxine (SYNTHROID, LEVOTHROID) 150 MCG tablet, Take 1 tablet p.o. daily, Disp: 90 tablet, Rfl: 4  •  Multiple Vitamins-Minerals (OCUVITE ADULT 50+ PO), Take  by mouth., Disp: , Rfl:   •  ondansetron (ZOFRAN) 4 MG tablet, Take 4 mg by mouth Every 8 (Eight) Hours As  Needed for Nausea or Vomiting., Disp: , Rfl:   •  prenatal vitamin (prenatal, CLASSIC, vitamin) tablet, Take  by mouth Daily., Disp: , Rfl:   •  traMADol (ULTRAM) 50 MG tablet, Take 1 tablet by mouth., Disp: , Rfl:   •  Turmeric 500 MG capsule, Take  by mouth., Disp: , Rfl:   •  vitamin D3 125 MCG (5000 UT) capsule capsule, Take  by mouth., Disp: , Rfl:   •  Zinc 50 MG capsule, Take  by mouth., Disp: , Rfl:   •  rosuvastatin (Crestor) 10 MG tablet, Take 1 tablet by mouth Every Night., Disp: 30 tablet, Rfl: 11      Assessment & Plan   1.  Diabetes mellitus type 1 with Hyperglycemia: Well-controlled, no significant issues with low blood sugars.  We will continue current management.  She is advised to keep glucose source in case of low blood sugars.  We will follow blood sugars and make further adjustments as needed. m.    2.  Hypertension: Well-controlled, continue current management.    3.  Hyperlipidemia: On Crestor and Zetia.    4.  Hypothyroidism: Levothyroxine, will follow TSH and free T4.    5.  End-stage renal disease: On hemodialysis.    6.  Diabetic peripheral neuropathy: She is on Vit D, B12.  She will benefit from diabetic shoes.      Assessment and plan from August 22, 2022 reviewed and updated            Jo Mahoney MD FACE.    6/8/2021: Addendum: Patient is checking her blood sugars 4 times daily.

## 2022-11-03 NOTE — PATIENT INSTRUCTIONS
Continue current management  Always keep glucose source in case of low blood sugar  Annual eye exam  Labs before follow-up.

## 2022-11-08 RX ORDER — ROSUVASTATIN CALCIUM 10 MG/1
TABLET, COATED ORAL
Qty: 30 TABLET | Refills: 11 | Status: SHIPPED | OUTPATIENT
Start: 2022-11-08

## 2022-11-21 RX ORDER — INSULIN LISPRO 100 [IU]/ML
INJECTION, SOLUTION INTRAVENOUS; SUBCUTANEOUS
Qty: 20 ML | Refills: 6 | Status: SHIPPED | OUTPATIENT
Start: 2022-11-21

## 2022-11-30 ENCOUNTER — TELEPHONE (OUTPATIENT)
Dept: ENDOCRINOLOGY | Facility: CLINIC | Age: 67
End: 2022-11-30

## 2022-12-13 ENCOUNTER — OFFICE VISIT (OUTPATIENT)
Dept: FAMILY MEDICINE CLINIC | Age: 67
End: 2022-12-13

## 2022-12-13 VITALS
TEMPERATURE: 98.2 F | HEIGHT: 62 IN | BODY MASS INDEX: 24.03 KG/M2 | RESPIRATION RATE: 17 BRPM | OXYGEN SATURATION: 99 % | SYSTOLIC BLOOD PRESSURE: 152 MMHG | WEIGHT: 130.6 LBS | HEART RATE: 68 BPM | DIASTOLIC BLOOD PRESSURE: 78 MMHG

## 2022-12-13 DIAGNOSIS — N18.5 CKD (CHRONIC KIDNEY DISEASE) STAGE 5, GFR LESS THAN 15 ML/MIN: ICD-10-CM

## 2022-12-13 DIAGNOSIS — I10 BENIGN HYPERTENSION: Primary | ICD-10-CM

## 2022-12-13 DIAGNOSIS — E03.9 ACQUIRED HYPOTHYROIDISM: ICD-10-CM

## 2022-12-13 DIAGNOSIS — E78.2 MIXED HYPERLIPIDEMIA: ICD-10-CM

## 2022-12-13 PROCEDURE — 99214 OFFICE O/P EST MOD 30 MIN: CPT | Performed by: FAMILY MEDICINE

## 2022-12-13 RX ORDER — CALCITRIOL 0.25 UG/1
CAPSULE, LIQUID FILLED ORAL
Qty: 90 CAPSULE | Refills: 0 | OUTPATIENT
Start: 2022-12-13

## 2022-12-13 RX ORDER — AMLODIPINE BESYLATE 2.5 MG/1
2.5 TABLET ORAL DAILY
Qty: 30 TABLET | Refills: 0 | Status: SHIPPED | OUTPATIENT
Start: 2022-12-13 | End: 2023-01-03 | Stop reason: SDUPTHER

## 2022-12-13 NOTE — PROGRESS NOTES
aN Handy presents to [facility]      Chief Complaint  Hypertension     HPI Amlodipine 10 mg and Losartan 50 mg discontinued due to BP decreased to 100/50 during renal dialysis. BP at home, since discontinuation of the medication: 128//65    Respiratory:  SOA:  no. Exertional dyspnea: no.  Dyspnea at rest: no.  Flights of stairs climbable: 3.  Cardiology:  Chest pain: no.  Dizziness: no. Easily fatigued: no.  Pedal edema: no.  Light-headiness: no.  Symptoms of claudication: no.  Orthopnea: no.  Palpitations: no. Tachycardia: no.  Ophthalmology:  Last eye exam date: 4/2022 . Vision changes: none.  Gastroenterology:  Heartburn: no.  Nausea: no.      Current Outpatient Medications:   •  acetaminophen (TYLENOL) 650 MG 8 hr tablet, Take 650 mg by mouth., Disp: , Rfl:   •  Apple Cider Vinegar 500 MG tablet, Take 1 tablet by mouth Daily., Disp: , Rfl:   •  ascorbic acid (VITAMIN C) 1000 MG tablet, Take 1,000 mg by mouth Daily., Disp: , Rfl:   •  aspirin 81 MG EC tablet, Take 81 mg by mouth Daily., Disp: , Rfl:   •  B Complex-Biotin-FA (B Complete) tablet, Take 1 tablet by mouth Daily., Disp: , Rfl:   •  BD Pen Needle Lupe 2nd Gen 32G X 4 MM misc, Pt to use with insulin pens 5 times daily, Disp: 150 each, Rfl: 1  •  Blood Glucose Monitoring Suppl (ONE TOUCH ULTRA 2) w/Device kit, ONETOUCH ULTRA 2 w/Device KIT, Disp: , Rfl:   •  bumetanide (BUMEX) 2 MG tablet, See administration instructions. 4 mg in the AM and 2 mg in the PM, Disp: , Rfl:   •  calcitriol (ROCALTROL) 0.25 MCG capsule, TAKE ONE CAPSULE BY MOUTH DAILY, Disp: 90 capsule, Rfl: 0  •  cetirizine (zyrTEC) 10 MG tablet, Take 10 mg by mouth Daily., Disp: , Rfl:   •  Cholecalciferol 50 MCG (2000 UT) capsule, Take 1 tablet by mouth Every Morning., Disp: , Rfl:   •  Continuous Blood Gluc  (Dexcom G6 ) device, 1 Device Daily., Disp: 1 each, Rfl: 1  •  Continuous Blood Gluc Sensor (Dexcom G6 Sensor), Every 10 (Ten)  Days., Disp: 2 each, Rfl: 6  •  Continuous Blood Gluc Transmit (Dexcom G6 Transmitter) misc, 1 Device Daily., Disp: 1 each, Rfl: 0  •  Cyanocobalamin (Vitamin B-12) 1000 MCG sublingual tablet, PLACE ONE TABLET UNDER THE TONGUE DAILY, Disp: 100 tablet, Rfl: 3  •  ELDERBERRY PO, Take  by mouth., Disp: , Rfl:   •  esomeprazole (nexIUM) 40 MG capsule, Take 1 capsule by mouth Daily., Disp: , Rfl:   •  ferrous sulfate 325 (65 FE) MG tablet, Take 1 tablet by mouth Daily., Disp: , Rfl:   •  gabapentin (NEURONTIN) 100 MG capsule, Take 1 capsule by mouth., Disp: , Rfl:   •  Glucagon (Baqsimi One Pack) 3 MG/DOSE powder, into the nostril(s) as directed by provider., Disp: , Rfl:   •  glucose blood test strip, ONETOUCH ULTRA BLUE STRP, Disp: , Rfl:   •  HumaLOG 100 UNIT/ML injection, INJECT UNDER THE SKIN AS DIRECTED VIA INSULIN PUMP, DO NOT EXCEED 40 UNITS DAILY, Disp: 20 mL, Rfl: 6  •  HYDROcodone-acetaminophen (NORCO) 5-325 MG per tablet, Take 1 tablet by mouth., Disp: , Rfl:   •  hydroxychloroquine (PLAQUENIL) 200 MG tablet, Take 200 mg by mouth 2 (Two) Times a Day., Disp: , Rfl:   •  Lancets (onetouch ultrasoft) lancets, ONETOUCH ULTRASOFT LANCETS, Disp: , Rfl:   •  levothyroxine (SYNTHROID, LEVOTHROID) 150 MCG tablet, Take 1 tablet p.o. daily, Disp: 90 tablet, Rfl: 4  •  Multiple Vitamins-Minerals (ICAPS AREDS 2 PO), Take 1 each by mouth 2 (Two) Times a Day., Disp: , Rfl:   •  ondansetron (ZOFRAN) 4 MG tablet, Take 4 mg by mouth Every 8 (Eight) Hours As Needed for Nausea or Vomiting., Disp: , Rfl:   •  prenatal vitamin (prenatal, CLASSIC, vitamin) tablet, Take  by mouth Daily., Disp: , Rfl:   •  rosuvastatin (CRESTOR) 10 MG tablet, TAKE ONE TABLET BY MOUTH ONCE NIGHTLY, Disp: 30 tablet, Rfl: 11  •  traMADol (ULTRAM) 50 MG tablet, Take 1 tablet by mouth., Disp: , Rfl:   •  Turmeric 500 MG capsule, Take 1 capsule by mouth Daily., Disp: , Rfl:   •  vitamin D3 125 MCG (5000 UT) capsule capsule, Take 1 capsule by mouth Every  Evening., Disp: , Rfl:   •  Zinc 50 MG capsule, Take 1 capsule by mouth Daily., Disp: , Rfl:   •  amLODIPine (NORVASC) 2.5 MG tablet, Take 1 tablet by mouth Daily., Disp: 30 tablet, Rfl: 0  •  ezetimibe (ZETIA) 10 MG tablet, Take 10 mg by mouth Daily., Disp: , Rfl:      Allergies   Allergen Reactions   • Azithromycin GI Intolerance and Other (See Comments)     nausea  nausea     • Adhesive Tape Rash        Past Medical History:   Diagnosis Date   • Allergies     YEAR ROUND   • Arthritis    • CKD (chronic kidney disease) stage 3, GFR 30-59 ml/min ([facility])    • Diabetic neuropathy ([facility])    • GERD (gastroesophageal reflux disease)    • History of tobacco use     2.5 PPD; 1670-1207   • Inflammatory polyarthritis ([facility])    • Kidney disease    • Lupus ([facility])    • Neuropathy    • Primary osteoarthritis    • Retinal hemorrhage     OU - LASER TREATMENT   • Rheumatoid arthritis ([facility])    • Wrist fracture        Past Surgical History:   Procedure Laterality Date   • ABDOMINAL HYSTERECTOMY     • CARPAL TUNNEL RELEASE Right 2022   • CATARACT EXTRACTION     • COLONOSCOPY  2021    4 POLYPS   • COLONOSCOPY W/ BIOPSIES  2008    POLYPS   • ENDOSCOPY      GERD   • ENDOSCOPY  2020    ESOPHAGEAL HYPERMOTILITY   • ENDOSCOPY  2021   • SINUS SURGERY     • TONSILLECTOMY AND ADENOIDECTOMY          Family History   Problem Relation Age of Onset   • Heart disease Mother    • Diabetes Father    • Hypertension Father    • Heart disease Father    • Diabetes Maternal Grandmother         Social History     Socioeconomic History   • Marital status: Single   • Number of children: 0   • Years of education: 14   Tobacco Use   • Smoking status: Former     Packs/day: 2.50     Years: 24.00     Pack years: 60.00     Types: Cigarettes     Start date:      Quit date:      Years since quittin.9   • Smokeless tobacco: Never   Vaping Use   • Vaping Use: Never used   Substance and Sexual Activity   •  "Alcohol use: Never   • Drug use: Defer   • Sexual activity: Defer        ROS: As in HPI      Objective   Vital Signs:  /78 (BP Location: Right arm, Patient Position: Sitting, Cuff Size: Adult)   Pulse 68   Temp 98.2 °F (36.8 °C) (Temporal)   Resp 17   Ht 157.5 cm (62\")   Wt 59.2 kg (130 lb 9.6 oz)   SpO2 99%   BMI 23.89 kg/m²   Estimated body mass index is 23.89 kg/m² as calculated from the following:    Height as of this encounter: 157.5 cm (62\").    Weight as of this encounter: 59.2 kg (130 lb 9.6 oz).        Physical Exam  General:  No acute distress, cheerful, well kept, interactive, good energy level.  Cardiac:  Heart regular rate and rhythm without murmur.  Respiratory:  Lungs clear to auscultation bilaterally and excellent air movement throughout.  Pulses:                            Right          Left         Bruit  Carotid                2+              2+           No  Radial                 2+              2+  Abd Ao               Not bounding  Femoral             Not palpable bilaterally  DP                      2+              2+  PT                      2+              2+  Venous:  Absent JVD. Absent lower extremity varicosities.  Dermatology:  Non-dystrophic toe nails. No Integument changes of the toes, feet, ankles, shins.  Lower Extremities:  No leg edema.  Toes are warm and pink    Result Review :                    Assessment and Plan   Diagnoses and all orders for this visit:    1. Benign hypertension (Primary)  Assessment & Plan:  MDM:  Systolic Hypertension. Resume amlodipine at low dose. Aware of diuresis resulting in low BP.  Continue to monitor    Orders:  -     amLODIPine (NORVASC) 2.5 MG tablet; Take 1 tablet by mouth Daily.  Dispense: 30 tablet; Refill: 0    2. CKD (chronic kidney disease) stage 5, GFR less than 15 ml/min ([facility])    3. Acquired hypothyroidism  Assessment & Plan:  MDM: Stable. Continue present medications without changes.        4. Mixed " hyperlipidemia  Assessment & Plan:  MDM: Stable. Continue present medications without changes.               Follow Up   No follow-ups on file.    Patient was given instructions and counseling regarding her condition or for health maintenance advice. Please see specific information pulled into the AVS if appropriate.   There are no Patient Instructions on file for this visit.   Rama Brooks MD

## 2022-12-13 NOTE — ASSESSMENT & PLAN NOTE
MDM:  Systolic Hypertension. Resume amlodipine at low dose. Aware of diuresis resulting in low BP.  Continue to monitor

## 2022-12-21 RX ORDER — CALCITRIOL 0.25 UG/1
CAPSULE, LIQUID FILLED ORAL
Qty: 90 CAPSULE | Refills: 0 | OUTPATIENT
Start: 2022-12-21

## 2022-12-21 NOTE — TELEPHONE ENCOUNTER
PATIENT IS REQUESTING THIS REFILL. IT HAS NOT BEEN PRESCRIBED SINCE 9/13/22 AND WAS FOR 90 CAPSULES. PATIENT IS DUE FOR REFILL.        Rx Refill Note  Requested Prescriptions     Pending Prescriptions Disp Refills   • calcitriol (ROCALTROL) 0.25 MCG capsule [Pharmacy Med Name: CALCITRIOL 0.25 MCG CAPSULE] 90 capsule 0     Sig: TAKE ONE CAPSULE BY MOUTH DAILY      Last office visit with prescribing clinician: 12/13/2022   Last telemedicine visit with prescribing clinician: 1/3/2023   Next office visit with prescribing clinician: 1/3/2023                         Would you like a call back once the refill request has been completed: [] Yes [] No    If the office needs to give you a call back, can they leave a voicemail: [] Yes [] No    Luz Arriola MA  12/21/22, 14:12 EST

## 2022-12-22 NOTE — TELEPHONE ENCOUNTER
Hub is instructed to read the documentation below to patient MEDICATION SHOULD BE PRESCRIBED BY  NEPHROLOGIST  PER DR FORTUNE

## 2022-12-27 RX ORDER — CALCITRIOL 0.25 UG/1
CAPSULE, LIQUID FILLED ORAL
Qty: 90 CAPSULE | Refills: 0 | OUTPATIENT
Start: 2022-12-27

## 2022-12-27 NOTE — TELEPHONE ENCOUNTER
Hub is instructed to read the documentation below to patient    Rx refill for calcitrol (ROCALTROL) 0.25 MCG must be filled through her nephrologist. Pt must contact her kidney doctor for refill on this prescription.

## 2023-01-03 ENCOUNTER — OFFICE VISIT (OUTPATIENT)
Dept: FAMILY MEDICINE CLINIC | Age: 68
End: 2023-01-03
Payer: MEDICARE

## 2023-01-03 VITALS
DIASTOLIC BLOOD PRESSURE: 71 MMHG | RESPIRATION RATE: 16 BRPM | TEMPERATURE: 97.1 F | HEIGHT: 62 IN | HEART RATE: 75 BPM | BODY MASS INDEX: 24.59 KG/M2 | OXYGEN SATURATION: 96 % | SYSTOLIC BLOOD PRESSURE: 133 MMHG | WEIGHT: 133.6 LBS

## 2023-01-03 DIAGNOSIS — I10 BENIGN HYPERTENSION: Primary | ICD-10-CM

## 2023-01-03 DIAGNOSIS — Z12.31 ENCOUNTER FOR SCREENING MAMMOGRAM FOR MALIGNANT NEOPLASM OF BREAST: ICD-10-CM

## 2023-01-03 PROCEDURE — 1160F RVW MEDS BY RX/DR IN RCRD: CPT | Performed by: FAMILY MEDICINE

## 2023-01-03 PROCEDURE — 1159F MED LIST DOCD IN RCRD: CPT | Performed by: FAMILY MEDICINE

## 2023-01-03 PROCEDURE — 99214 OFFICE O/P EST MOD 30 MIN: CPT | Performed by: FAMILY MEDICINE

## 2023-01-03 RX ORDER — AMLODIPINE BESYLATE 2.5 MG/1
2.5 TABLET ORAL DAILY
Qty: 90 TABLET | Refills: 0 | Status: SHIPPED | OUTPATIENT
Start: 2023-01-03 | End: 2023-04-04

## 2023-01-03 NOTE — ASSESSMENT & PLAN NOTE
MDM: Hypertension is not controlled.  Increase amlodipine to 5 mg.  Hold amlodipine to the following day if systolic  or lower

## 2023-01-03 NOTE — PROGRESS NOTES
Chief Complaint  Blood Pressure Follow Up and Med Refill    History of Present Illness   Losartan discontinued.  Amlodipine decreased from 10 mg to 2.5 mg.  Takes the medication in th morning.  Takes it in the evening on dialysis days (Tues, Thurs, Sat) due to low BP during dialysis.  No palpitations, light-headiness, chest discomfort, shortness of air, headache, blurred vision or pedal edema.    11 non-dialysis day readings:  blood pressure (111/61)126-183/54-70   Most 140s/60s  3 dialysis day readings: 183/69  4 hrs post dialysis, 120/54 pre, 149/62 pre    Objective     Vital Signs:   /71 (BP Location: Right arm, Patient Position: Sitting, Cuff Size: Adult) Comment: NO BP medication(s) yet this day. Had dialysis this morning.  Pulse 75   Temp 97.1 °F (36.2 °C) (Infrared)   Resp 16   Ht 157.5 cm (62\")   Wt 60.6 kg (133 lb 9.6 oz)   SpO2 96%   BMI 24.44 kg/m²   Current Outpatient Medications on File Prior to Visit   Medication Sig Dispense Refill   • acetaminophen (TYLENOL) 650 MG 8 hr tablet Take 650 mg by mouth Daily As Needed for Mild Pain, Moderate Pain or Headache.     • amLODIPine (NORVASC) 2.5 MG tablet Take 1 tablet by mouth Daily. 30 tablet 0   • Apple Cider Vinegar 500 MG tablet Take 1 tablet by mouth Daily.     • ascorbic acid (VITAMIN C) 1000 MG tablet Take 1,000 mg by mouth Daily.     • aspirin 81 MG EC tablet Take 81 mg by mouth Daily.     • B Complex-Biotin-FA (B Complete) tablet Take 1 tablet by mouth Daily.     • BD Pen Needle Lupe 2nd Gen 32G X 4 MM misc Pt to use with insulin pens 5 times daily 150 each 1   • Blood Glucose Monitoring Suppl (ONE TOUCH ULTRA 2) w/Device kit ONETOUCH ULTRA 2 w/Device KIT     • bumetanide (BUMEX) 2 MG tablet See administration instructions. 4 mg in the AM and 2 mg in the PM     • calcitriol (ROCALTROL) 0.25 MCG capsule TAKE ONE CAPSULE BY MOUTH DAILY 90 capsule 0   • cetirizine (zyrTEC) 10 MG tablet Take 10 mg by mouth Daily.     • Cholecalciferol 50 MCG  (2000 UT) capsule Take 1 tablet by mouth 2 (Two) Times a Day.     • Continuous Blood Gluc  (Dexcom G6 ) device 1 Device Daily. 1 each 1   • Continuous Blood Gluc Sensor (Dexcom G6 Sensor) Every 10 (Ten) Days. 2 each 6   • Continuous Blood Gluc Transmit (Dexcom G6 Transmitter) misc 1 Device Daily. 1 each 0   • Cyanocobalamin (Vitamin B-12) 1000 MCG sublingual tablet PLACE ONE TABLET UNDER THE TONGUE DAILY 100 tablet 3   • ELDERBERRY PO Take 1 each by mouth Daily.     • esomeprazole (nexIUM) 40 MG capsule Take 1 capsule by mouth Daily.     • ezetimibe (ZETIA) 10 MG tablet Take 10 mg by mouth Daily.     • ferrous sulfate 325 (65 FE) MG tablet Take 1 tablet by mouth Daily.     • gabapentin (NEURONTIN) 100 MG capsule Take 1 capsule by mouth Daily.     • Glucagon (Baqsimi One Pack) 3 MG/DOSE powder into the nostril(s) as directed by provider.     • glucose blood test strip ONETOUCH ULTRA BLUE STRP     • HumaLOG 100 UNIT/ML injection INJECT UNDER THE SKIN AS DIRECTED VIA INSULIN PUMP, DO NOT EXCEED 40 UNITS DAILY 20 mL 6   • HYDROcodone-acetaminophen (NORCO) 5-325 MG per tablet Take 1 tablet by mouth Every 6 (Six) Hours As Needed for Moderate Pain or Severe Pain.     • hydroxychloroquine (PLAQUENIL) 200 MG tablet Take 200 mg by mouth 2 (Two) Times a Day.     • Lancets (onetouch ultrasoft) lancets ONETOUCH ULTRASOFT LANCETS     • levothyroxine (SYNTHROID, LEVOTHROID) 150 MCG tablet Take 1 tablet p.o. daily 90 tablet 4   • Multiple Vitamins-Minerals (ICAPS AREDS 2 PO) Take 1 each by mouth 2 (Two) Times a Day.     • ondansetron (ZOFRAN) 4 MG tablet Take 4 mg by mouth Every 8 (Eight) Hours As Needed for Nausea or Vomiting.     • prenatal vitamin (prenatal, CLASSIC, vitamin) tablet Take 2 tablets by mouth Daily.     • rosuvastatin (CRESTOR) 10 MG tablet TAKE ONE TABLET BY MOUTH ONCE NIGHTLY 30 tablet 11   • traMADol (ULTRAM) 50 MG tablet 1 tab po q6-q8h prn moderate to severe pain.     • Turmeric 500 MG capsule  Take 1 capsule by mouth Daily.     • Zinc 50 MG capsule Take 1 capsule by mouth Daily.     • [DISCONTINUED] vitamin D3 125 MCG (5000 UT) capsule capsule Take 1 capsule by mouth Every Evening.       No current facility-administered medications on file prior to visit.        Past Medical History:   Diagnosis Date   • Allergies     YEAR ROUND   • Arthritis    • CKD (chronic kidney disease) stage 3, GFR 30-59 ml/min ([facility])    • Diabetic neuropathy ([facility])    • GERD (gastroesophageal reflux disease)    • History of tobacco use     2.5 PPD; 9163-8552   • Inflammatory polyarthritis ([facility])    • Kidney disease    • Lupus ([facility])    • Neuropathy    • Primary osteoarthritis    • Retinal hemorrhage     OU - LASER TREATMENT   • Rheumatoid arthritis ([facility])    • Wrist fracture       Past Surgical History:   Procedure Laterality Date   • ABDOMINAL HYSTERECTOMY     • CARPAL TUNNEL RELEASE Right 2022   • CATARACT EXTRACTION     • COLONOSCOPY  2021    4 POLYPS   • COLONOSCOPY W/ BIOPSIES      POLYPS   • ENDOSCOPY      GERD   • ENDOSCOPY  2020    ESOPHAGEAL HYPERMOTILITY   • ENDOSCOPY  2021   • SINUS SURGERY     • TONSILLECTOMY AND ADENOIDECTOMY        Family History   Problem Relation Age of Onset   • Heart disease Mother    • Diabetes Father    • Hypertension Father    • Heart disease Father    • Diabetes Maternal Grandmother       Social History     Socioeconomic History   • Marital status: Single   • Number of children: 0   • Years of education: 14   Tobacco Use   • Smoking status: Former     Packs/day: 2.50     Years: 24.00     Pack years: 60.00     Types: Cigarettes     Start date:      Quit date:      Years since quittin.0   • Smokeless tobacco: Never   Vaping Use   • Vaping Use: Never used   Substance and Sexual Activity   • Alcohol use: Never   • Drug use: Defer   • Sexual activity: Defer         Hospital Outpatient Visit on 10/07/2022   Component Date Value Ref  Range Status   • WBC 10/07/2022 9.90  3.40 - 10.80 10*3/mm3 Final   • RBC 10/07/2022 3.45 (L)  3.77 - 5.28 10*6/mm3 Final   • Hemoglobin 10/07/2022 10.5 (L)  12.0 - 15.9 g/dL Final   • Hematocrit 10/07/2022 32.3 (L)  34.0 - 46.6 % Final   • MCV 10/07/2022 93.6  79.0 - 97.0 fL Final   • MCH 10/07/2022 30.5  26.6 - 33.0 pg Final   • MCHC 10/07/2022 32.6  31.5 - 35.7 g/dL Final   • RDW 10/07/2022 14.1  12.3 - 15.4 % Final   • RDW-SD 10/07/2022 46.4  37.0 - 54.0 fl Final   • MPV 10/07/2022 7.5  6.0 - 12.0 fL Final   • Platelets 10/07/2022 328  140 - 450 10*3/mm3 Final   • Neutrophil % 10/07/2022 76.1 (H)  42.7 - 76.0 % Final   • Lymphocyte % 10/07/2022 14.9 (L)  19.6 - 45.3 % Final   • Monocyte % 10/07/2022 8.3  5.0 - 12.0 % Final   • Eosinophil % 10/07/2022 0.0 (L)  0.3 - 6.2 % Final   • Basophil % 10/07/2022 0.7  0.0 - 1.5 % Final   • Neutrophils, Absolute 10/07/2022 7.50 (H)  1.70 - 7.00 10*3/mm3 Final   • Lymphocytes, Absolute 10/07/2022 1.50  0.70 - 3.10 10*3/mm3 Final   • Monocytes, Absolute 10/07/2022 0.80  0.10 - 0.90 10*3/mm3 Final   • Eosinophils, Absolute 10/07/2022 0.00  0.00 - 0.40 10*3/mm3 Final   • Basophils, Absolute 10/07/2022 0.10  0.00 - 0.20 10*3/mm3 Final   • nRBC 10/07/2022 0.0  0.0 - 0.2 /100 WBC Final   • PTT 10/07/2022 25.8  24.0 - 31.0 seconds Final   • Protime 10/07/2022 11.2  9.6 - 11.7 Seconds Final   • INR 10/07/2022 1.09  0.93 - 1.10 Final         Physical Exam   GENERAL:   No acute distress, pleasant, excellent energy level.  HEART:    Regular rate and rhythm without murmur.  LUNGS:    Clear to auscultation, excellent air movement throughout.   EXTREMITIES:  No pedal edema.  INTEGUMENT:  Warm, dry, pink.  No exanthema.    Result Review  Data Reviewed:{ Labs  Result Review  Imaging  Med Tab  Media :23}                     Assessment and Plan {CC Problem List  Visit Diagnosis  ROS  Review (Popup)  Health Maintenance  Quality  BestPractice  Medications  SmartSets  SnapShot  Encounters  Media :23}   Diagnoses and all orders for this visit:    1. Benign hypertension (Primary)  Assessment & Plan:  MDM: Hypertension is not controlled.  Increase amlodipine to 5 mg.  Hold amlodipine to the following day if systolic  or lower      2. Encounter for screening mammogram for malignant neoplasm of breast  Comments:  MDM: Health prevention        Follow Up {Instructions Charge Capture  Follow-up Communications :23}     Patient was given instructions and counseling regarding her condition or for health maintenance advice. Please see specific information pulled into the AVS (placed there by myself) if appropriate.    Return in about 3 weeks (around 1/24/2023) for PAP, F/U BP.        Rama Brooks MD

## 2023-01-09 DIAGNOSIS — I10 BENIGN HYPERTENSION: ICD-10-CM

## 2023-01-09 RX ORDER — AMLODIPINE BESYLATE 2.5 MG/1
TABLET ORAL
Qty: 30 TABLET | OUTPATIENT
Start: 2023-01-09

## 2023-01-09 NOTE — TELEPHONE ENCOUNTER
[facility] is instructed to read the documentation below to patient  RX request received for Amlodipine 2.5 mg. However, a 90 day prescription was sent to pharmacy on 1.3.2023 with pharmacy confirming receipt of rx at 2:19 pm that day. It was sent to [facility] in [place]. If prescription will need to be transferred, patient will need to contact the pharmacy where she wants it to be sent to. That pharmacy will then contact the pharmacy where it was originally sent, and transfer it to the pharmacy where patient wants it to go.

## 2023-01-24 ENCOUNTER — OFFICE VISIT (OUTPATIENT)
Dept: FAMILY MEDICINE CLINIC | Age: 68
End: 2023-01-24
Payer: MEDICARE

## 2023-01-24 VITALS
DIASTOLIC BLOOD PRESSURE: 92 MMHG | HEIGHT: 62 IN | SYSTOLIC BLOOD PRESSURE: 156 MMHG | BODY MASS INDEX: 24.14 KG/M2 | RESPIRATION RATE: 18 BRPM | WEIGHT: 131.2 LBS | HEART RATE: 72 BPM | OXYGEN SATURATION: 99 % | TEMPERATURE: 98.2 F

## 2023-01-24 DIAGNOSIS — Z12.39 OTHER SCREENING BREAST EXAMINATION: ICD-10-CM

## 2023-01-24 DIAGNOSIS — Z01.419 ROUTINE GYNECOLOGICAL EXAMINATION: Primary | ICD-10-CM

## 2023-01-24 DIAGNOSIS — Z12.72 SPECIAL SCREENING FOR MALIGNANT NEOPLASMS, VAGINA: ICD-10-CM

## 2023-01-24 DIAGNOSIS — E03.9 ACQUIRED HYPOTHYROIDISM: ICD-10-CM

## 2023-01-24 PROCEDURE — 99213 OFFICE O/P EST LOW 20 MIN: CPT | Performed by: FAMILY MEDICINE

## 2023-01-24 RX ORDER — LEVOTHYROXINE SODIUM 0.15 MG/1
TABLET ORAL
Qty: 90 TABLET | Refills: 4 | Status: SHIPPED | OUTPATIENT
Start: 2023-01-24

## 2023-01-24 NOTE — PROGRESS NOTES
NAME@ presents to [facility]      Chief Complaint  Pap    HPI     Menarche: 16 years.  LMP Date: 1993  Menstrual Cycle: Regular.  Abnormal Vaginal Bleeding: Bright red spot on tissue , 2 weeks ago  Contraception: None.  Last Pap Date: 7/5/2018  History of Abnormal PAP: No.  Abnormal Vaginal Discharge: No.  H/O STD: No.  Urinary Problems: No.  Breast Complaints: Lt breast soreness  Breast Self Exam: Yes.  Last Mammogram Date:7/5/2018  History of GYN Surgery: [facility] [place] 2093.      Current Outpatient Medications:   •  acetaminophen (TYLENOL) 650 MG 8 hr tablet, Take 650 mg by mouth Daily As Needed for Mild Pain, Moderate Pain or Headache., Disp: , Rfl:   •  amLODIPine (NORVASC) 2.5 MG tablet, Take 1 tablet by mouth Daily., Disp: 90 tablet, Rfl: 0  •  Apple Cider Vinegar 500 MG tablet, Take 1 tablet by mouth Daily., Disp: , Rfl:   •  ascorbic acid (VITAMIN C) 1000 MG tablet, Take 1,000 mg by mouth Daily., Disp: , Rfl:   •  aspirin 81 MG EC tablet, Take 81 mg by mouth Daily., Disp: , Rfl:   •  B Complex-Biotin-FA (B Complete) tablet, Take 1 tablet by mouth Daily., Disp: , Rfl:   •  BD Pen Needle Lupe 2nd Gen 32G X 4 MM misc, Pt to use with insulin pens 5 times daily, Disp: 150 each, Rfl: 1  •  Blood Glucose Monitoring Suppl (ONE TOUCH ULTRA 2) w/Device kit, ONETOUCH ULTRA 2 w/Device KIT, Disp: , Rfl:   •  bumetanide (BUMEX) 2 MG tablet, See administration instructions. 4 mg in the AM and 2 mg in the PM, Disp: , Rfl:   •  calcitriol (ROCALTROL) 0.25 MCG capsule, TAKE ONE CAPSULE BY MOUTH DAILY, Disp: 90 capsule, Rfl: 0  •  cetirizine (zyrTEC) 10 MG tablet, Take 10 mg by mouth Daily., Disp: , Rfl:   •  Cholecalciferol 50 MCG (2000 UT) capsule, Take 1 tablet by mouth 2 (Two) Times a Day., Disp: , Rfl:   •  Continuous Blood Gluc  (Dexcom G6 ) device, 1 Device Daily., Disp: 1 each, Rfl: 1  •  Continuous Blood Gluc Sensor (Dexcom G6 Sensor), Every 10 (Ten) Days., Disp: 2 each, Rfl:  6  •  Continuous Blood Gluc Transmit (Dexcom G6 Transmitter) misc, 1 Device Daily., Disp: 1 each, Rfl: 0  •  Cyanocobalamin (Vitamin B-12) 1000 MCG sublingual tablet, PLACE ONE TABLET UNDER THE TONGUE DAILY, Disp: 100 tablet, Rfl: 3  •  ELDERBERRY PO, Take 1 each by mouth Daily., Disp: , Rfl:   •  esomeprazole (nexIUM) 40 MG capsule, Take 1 capsule by mouth Daily., Disp: , Rfl:   •  ezetimibe (ZETIA) 10 MG tablet, Take 10 mg by mouth Daily., Disp: , Rfl:   •  ferrous sulfate 325 (65 FE) MG tablet, Take 1 tablet by mouth Daily., Disp: , Rfl:   •  gabapentin (NEURONTIN) 100 MG capsule, Take 1 capsule by mouth Daily., Disp: , Rfl:   •  Glucagon (Baqsimi One Pack) 3 MG/DOSE powder, into the nostril(s) as directed by provider., Disp: , Rfl:   •  glucose blood test strip, ONETOUCH ULTRA BLUE STRP, Disp: , Rfl:   •  HumaLOG 100 UNIT/ML injection, INJECT UNDER THE SKIN AS DIRECTED VIA INSULIN PUMP, DO NOT EXCEED 40 UNITS DAILY, Disp: 20 mL, Rfl: 6  •  HYDROcodone-acetaminophen (NORCO) 5-325 MG per tablet, Take 1 tablet by mouth Every 6 (Six) Hours As Needed for Moderate Pain or Severe Pain., Disp: , Rfl:   •  hydroxychloroquine (PLAQUENIL) 200 MG tablet, Take 200 mg by mouth 2 (Two) Times a Day., Disp: , Rfl:   •  Lancets (onetouch ultrasoft) lancets, ONETOUCH ULTRASOFT LANCETS, Disp: , Rfl:   •  levothyroxine (SYNTHROID, LEVOTHROID) 150 MCG tablet, Take 1 tablet p.o. daily, Disp: 90 tablet, Rfl: 4  •  Multiple Vitamins-Minerals (ICAPS AREDS 2 PO), Take 1 each by mouth 2 (Two) Times a Day., Disp: , Rfl:   •  ondansetron (ZOFRAN) 4 MG tablet, Take 4 mg by mouth Every 8 (Eight) Hours As Needed for Nausea or Vomiting., Disp: , Rfl:   •  prenatal vitamin (prenatal, CLASSIC, vitamin) tablet, Take 2 tablets by mouth Daily., Disp: , Rfl:   •  rosuvastatin (CRESTOR) 10 MG tablet, TAKE ONE TABLET BY MOUTH ONCE NIGHTLY, Disp: 30 tablet, Rfl: 11  •  traMADol (ULTRAM) 50 MG tablet, 1 tab po q6-q8h prn moderate to severe pain., Disp: ,  Rfl:   •  Turmeric 500 MG capsule, Take 1 capsule by mouth Daily., Disp: , Rfl:   •  Zinc 50 MG capsule, Take 1 capsule by mouth Daily., Disp: , Rfl:      Allergies   Allergen Reactions   • Azithromycin GI Intolerance and Other (See Comments)     nausea  nausea     • Adhesive Tape Rash        Past Medical History:   Diagnosis Date   • Allergies     YEAR ROUND   • Arthritis    • CKD (chronic kidney disease) stage 3, GFR 30-59 ml/min ([facility])    • Diabetic neuropathy ([facility])    • GERD (gastroesophageal reflux disease)    • History of tobacco use     2.5 PPD; [id number]   • Inflammatory polyarthritis ([facility])    • Kidney disease    • Lupus ([facility])    • Neuropathy    • Primary osteoarthritis    • Retinal hemorrhage     OU - LASER TREATMENT   • Rheumatoid arthritis ([facility])    • Wrist fracture        Past Surgical History:   Procedure Laterality Date   • ABDOMINAL HYSTERECTOMY     • CARPAL TUNNEL RELEASE Right 2022   • CATARACT EXTRACTION     • COLONOSCOPY  2021    4 POLYPS   • COLONOSCOPY W/ BIOPSIES      POLYPS   • ENDOSCOPY      GERD   • ENDOSCOPY  2020    ESOPHAGEAL HYPERMOTILITY   • ENDOSCOPY  2021   • SINUS SURGERY     • TONSILLECTOMY AND ADENOIDECTOMY          Family History   Problem Relation Age of Onset   • Heart disease Mother    • Diabetes Father    • Hypertension Father    • Heart disease Father    • Diabetes Maternal Grandmother         Social History     Socioeconomic History   • Marital status: Single   • Number of children: 0   • Years of education: 14   Tobacco Use   • Smoking status: Former     Packs/day: 2.50     Years: 24.00     Pack years: 60.00     Types: Cigarettes     Start date:      Quit date:      Years since quittin.0     Passive exposure: Past   • Smokeless tobacco: Never   Vaping Use   • Vaping Use: Never used   Substance and Sexual Activity   • Alcohol use: Never   • Drug use: Defer   • Sexual activity: Defer        ROS: Did not take  "BP rx today due to dialysis today    Objective   Vital Signs:  /92 (BP Location: Left arm, Patient Position: Sitting, Cuff Size: Adult) Comment: No b/p meds yet today. Just left dyalysis Comment (BP Location): forearm  Pulse 72   Temp 98.2 °F (36.8 °C) (Temporal)   Resp 18   Ht 157.5 cm (62\")   Wt 59.5 kg (131 lb 3.2 oz)   SpO2 99%   BMI 24.00 kg/m²   Estimated body mass index is 24 kg/m² as calculated from the following:    Height as of this encounter: 157.5 cm (62\").    Weight as of this encounter: 59.5 kg (131 lb 3.2 oz).        Physical Exam  Skin:    No dystrophic nevi, no palmar crease pallor, no conjunctival palpebrae pallor.  Neck:   No thyroidmegaly, no thyroid nodules. Lobes are symmetric.  Lymph Nodes:   No palpable axillary, cervical, supraclavicular or infraclavicular lymph                                nodes.  Heart:     Regular rate and rhythm.  Lungs:   Clear to auscultation, excellent air movement throughout.  Breasts:  Symmetric.  No palpable mass, skin changes, nipple discharge or tenderness.  Abdomen:  Normal bowel sounds and frequency.  Abdomen is soft and non-tender.   No palpable mass.      External Genitalia:   No erythema, pallor, exanthema or lesions.  Fragile posterior forchette  Vagina:   Healthy pink mucosa without lesions.   Adnexa:  No mass or tenderness, bilaterally.    Result Review :                    Assessment and Plan   Diagnoses and all orders for this visit:    1. Routine gynecological examination (Primary)  Comments:  MDM: Health maintenance  Orders:  -     IGP,Aptima HPV,Age Gdln    2. Special screening for malignant neoplasms, vagina  Comments:  MDM: Health maintenance    3. Other screening breast examination  Comments:  MDM: Health maintenance.  Patient states she has a mammogram scheduled, in a few weeks, in [place]    4. Acquired hypothyroidism  -     levothyroxine (SYNTHROID, LEVOTHROID) 150 MCG tablet; Take 1 tablet p.o. daily  Dispense: 90 tablet; " Refill: 4             Follow Up   Return in about 3 months (around 4/24/2023) for Cor.    Patient was given instructions and counseling regarding her condition or for health maintenance advice. Please see specific information pulled into the AVS if appropriate.

## 2023-01-27 LAB
AGE GDLN ACOG TESTING: NORMAL
CYTOLOGIST CVX/VAG CYTO: NORMAL
CYTOLOGY CVX/VAG DOC CYTO: NORMAL
CYTOLOGY CVX/VAG DOC THIN PREP: NORMAL
DX ICD CODE: NORMAL
HIV 1 & 2 AB SER-IMP: NORMAL
OTHER STN SPEC: NORMAL
STAT OF ADQ CVX/VAG CYTO-IMP: NORMAL

## 2023-01-31 ENCOUNTER — OFFICE VISIT (OUTPATIENT)
Dept: FAMILY MEDICINE CLINIC | Age: 68
End: 2023-01-31
Payer: MEDICARE

## 2023-01-31 VITALS
TEMPERATURE: 97.5 F | SYSTOLIC BLOOD PRESSURE: 140 MMHG | RESPIRATION RATE: 18 BRPM | HEIGHT: 62 IN | WEIGHT: 133.2 LBS | DIASTOLIC BLOOD PRESSURE: 82 MMHG | HEART RATE: 77 BPM | OXYGEN SATURATION: 99 % | BODY MASS INDEX: 24.51 KG/M2

## 2023-01-31 DIAGNOSIS — M79.652 PAIN IN LEFT THIGH: Primary | ICD-10-CM

## 2023-01-31 PROCEDURE — 99213 OFFICE O/P EST LOW 20 MIN: CPT | Performed by: FAMILY MEDICINE

## 2023-01-31 NOTE — PROGRESS NOTES
"Chief Complaint  Leg Pain (Left )    History of Present Illness     Left lateral thigh pain x 6 weeks.  \"Ever since I started dialysis.  It hurts when I'm sitting during dialysis.  It's better when I get up and walk around.  I cannot lay on my left side or it'll hurt. It's starting to go to my back. Going to a chiropractor helps.\"    Objective     Vital Signs:   /82 (BP Location: Right arm, Patient Position: Sitting, Cuff Size: Adult) Comment: Could not b/p med this AM (had dialysis)  Pulse 77   Temp 97.5 °F (36.4 °C) (Temporal)   Resp 18   Ht 157.5 cm (62\")   Wt 60.4 kg (133 lb 3.2 oz)   SpO2 99%   BMI 24.36 kg/m²   Current Outpatient Medications on File Prior to Visit   Medication Sig Dispense Refill   • acetaminophen (TYLENOL) 650 MG 8 hr tablet Take 650 mg by mouth Daily As Needed for Mild Pain, Moderate Pain or Headache.     • amLODIPine (NORVASC) 2.5 MG tablet Take 1 tablet by mouth Daily. 90 tablet 0   • Apple Cider Vinegar 500 MG tablet Take 1 tablet by mouth Daily.     • ascorbic acid (VITAMIN C) 1000 MG tablet Take 1,000 mg by mouth Daily.     • aspirin 81 MG EC tablet Take 81 mg by mouth Daily.     • B Complex-Biotin-FA (B Complete) tablet Take 1 tablet by mouth Daily.     • BD Pen Needle Lupe 2nd Gen 32G X 4 MM misc Pt to use with insulin pens 5 times daily 150 each 1   • Blood Glucose Monitoring Suppl (ONE TOUCH ULTRA 2) w/Device kit ONETOUCH ULTRA 2 w/Device KIT     • bumetanide (BUMEX) 2 MG tablet See administration instructions. 4 mg in the AM and 2 mg in the PM     • calcitriol (ROCALTROL) 0.25 MCG capsule TAKE ONE CAPSULE BY MOUTH DAILY 90 capsule 0   • cetirizine (zyrTEC) 10 MG tablet Take 10 mg by mouth Daily.     • Cholecalciferol 50 MCG (2000 UT) capsule Take 1 tablet by mouth 2 (Two) Times a Day.     • Continuous Blood Gluc  (Dexcom G6 ) device 1 Device Daily. 1 each 1   • Continuous Blood Gluc Sensor (Dexcom G6 Sensor) Every 10 (Ten) Days. 2 each 6   • Continuous " Blood Gluc Transmit (Dexcom G6 Transmitter) misc 1 Device Daily. 1 each 0   • Cyanocobalamin (Vitamin B-12) 1000 MCG sublingual tablet PLACE ONE TABLET UNDER THE TONGUE DAILY 100 tablet 3   • ELDERBERRY PO Take 1 each by mouth Daily.     • esomeprazole (nexIUM) 40 MG capsule Take 1 capsule by mouth Daily.     • ezetimibe (ZETIA) 10 MG tablet Take 10 mg by mouth Daily.     • ferrous sulfate 325 (65 FE) MG tablet Take 1 tablet by mouth Daily.     • gabapentin (NEURONTIN) 100 MG capsule Take 1 capsule by mouth Daily.     • Glucagon (Baqsimi One Pack) 3 MG/DOSE powder into the nostril(s) as directed by provider.     • glucose blood test strip ONETOUCH ULTRA BLUE STRP     • HumaLOG 100 UNIT/ML injection INJECT UNDER THE SKIN AS DIRECTED VIA INSULIN PUMP, DO NOT EXCEED 40 UNITS DAILY 20 mL 6   • HYDROcodone-acetaminophen (NORCO) 5-325 MG per tablet Take 1 tablet by mouth Every 6 (Six) Hours As Needed for Moderate Pain or Severe Pain.     • hydroxychloroquine (PLAQUENIL) 200 MG tablet Take 200 mg by mouth 2 (Two) Times a Day.     • Lancets (onetouch ultrasoft) lancets ONETOUCH ULTRASOFT LANCETS     • levothyroxine (SYNTHROID, LEVOTHROID) 150 MCG tablet Take 1 tablet p.o. daily 90 tablet 4   • Multiple Vitamins-Minerals (ICAPS AREDS 2 PO) Take 1 each by mouth 2 (Two) Times a Day.     • ondansetron (ZOFRAN) 4 MG tablet Take 4 mg by mouth Every 8 (Eight) Hours As Needed for Nausea or Vomiting.     • prenatal vitamin (prenatal, CLASSIC, vitamin) tablet Take 2 tablets by mouth Daily.     • rosuvastatin (CRESTOR) 10 MG tablet TAKE ONE TABLET BY MOUTH ONCE NIGHTLY 30 tablet 11   • traMADol (ULTRAM) 50 MG tablet 1 tab po q6-q8h prn moderate to severe pain.     • Turmeric 500 MG capsule Take 1 capsule by mouth Daily.     • Zinc 50 MG capsule Take 1 capsule by mouth Daily.       No current facility-administered medications on file prior to visit.        Past Medical History:   Diagnosis Date   • Allergies     YEAR ROUND   •  Arthritis    • CKD (chronic kidney disease) stage 3, GFR 30-59 ml/min ([facility])    • Diabetic neuropathy ([facility])    • GERD (gastroesophageal reflux disease)    • History of tobacco use     2.5 PPD; 2281-0278   • Inflammatory polyarthritis ([facility])    • Kidney disease    • Lupus ([facility])    • Neuropathy    • Primary osteoarthritis    • Retinal hemorrhage     OU - LASER TREATMENT   • Rheumatoid arthritis ([facility])    • Wrist fracture       Past Surgical History:   Procedure Laterality Date   • ABDOMINAL HYSTERECTOMY     • CARPAL TUNNEL RELEASE Right 2022   • CATARACT EXTRACTION     • COLONOSCOPY  2021    4 POLYPS   • COLONOSCOPY W/ BIOPSIES      POLYPS   • ENDOSCOPY      GERD   • ENDOSCOPY  2020    ESOPHAGEAL HYPERMOTILITY   • ENDOSCOPY  2021   • SINUS SURGERY     • TONSILLECTOMY AND ADENOIDECTOMY        Family History   Problem Relation Age of Onset   • Heart disease Mother    • Diabetes Father    • Hypertension Father    • Heart disease Father    • Diabetes Maternal Grandmother       Social History     Socioeconomic History   • Marital status: Single   • Number of children: 0   • Years of education: 14   Tobacco Use   • Smoking status: Former     Packs/day: 2.50     Years: 24.00     Pack years: 60.00     Types: Cigarettes     Start date:      Quit date:      Years since quittin.1     Passive exposure: Past   • Smokeless tobacco: Never   Vaping Use   • Vaping Use: Never used   Substance and Sexual Activity   • Alcohol use: Never   • Drug use: Defer   • Sexual activity: Defer         Office Visit on 2023   Component Date Value Ref Range Status   • Age Gdln ACOG Testing 2023 Comment   Final    <21 or >65 or no age provided   • Diagnosis 2023 Comment   Final    NEGATIVE FOR INTRAEPITHELIAL LESION OR MALIGNANCY.  CELLULAR CHANGES ASSOCIATED WITH ATROPHY ARE PRESENT.   • Specimen adequacy: 2023 Comment   Final    Satisfactory for evaluation.   Endocervical component may not be  distinguished in cases of atrophy.   • Clinician Provided ICD-10: 01/24/2023 Comment   Final    Z01.419   • Performed by: 01/24/2023 Comment   Final    Mirian Oliva, Cytotechnologist (ASCP)   • . 01/24/2023 .   Final   • Note: 01/24/2023 Comment   Final    The Pap smear is a screening test designed to aid in the detection of  premalignant and malignant conditions of the uterine cervix.  It is not a  diagnostic procedure and should not be used as the sole means of detecting  cervical cancer.  Both false-positive and false-negative reports do occur.   • Method: 01/24/2023 Comment   Final    This liquid based ThinPrep(R) pap test was screened with the  use of an image guided system.         Physical Exam   No acute distress.  20 cm above the patella the right thigh is 45.0 cm, the left thigh is 45.5 cm.  There is no calor or rubor.  There is firmness, slightly balled up mid portion  of the left vastus lateralis muscle, possibly involving the iliotibial tract and there is mild tenderness upon palpation.  No hematoma or ecchymosis.     Result Review  Data Reviewed:{ Labs  Result Review  Imaging  Med Tab  Media :23}                     Assessment and Plan {CC Problem List  Visit Diagnosis  ROS  Review (Popup)  Health Maintenance  Quality  BestPractice  Medications  SmartSets  SnapShot Encounters  Media :23}   Diagnoses and all orders for this visit:    1. Pain in left thigh (Primary)  Comments:  MDM: Possible iliotibial tract strain.  Recommend PT.  Patient states she has PT tomorrow, ordered by Dr. Mendez, for this issue          Follow Up {Instructions Charge Capture  Follow-up Communications :23}     Patient was given instructions and counseling regarding her condition or for health maintenance advice. Please see specific information pulled into the AVS (placed there by myself) if appropriate.    Return for PRN.  Rama Brooks MD

## 2023-02-07 RX ORDER — LEVOTHYROXINE SODIUM 137 UG/1
1 TABLET ORAL DAILY
COMMUNITY
Start: 2023-01-11

## 2023-02-22 ENCOUNTER — TELEPHONE (OUTPATIENT)
Dept: ENDOCRINOLOGY | Facility: CLINIC | Age: 68
End: 2023-02-22
Payer: MEDICARE

## 2023-02-22 NOTE — TELEPHONE ENCOUNTER
SURAJ from [employer] Filled out and put in Dr Mahoney's inbox for signature.  Will fax once signed along with last ON of 11-3-22

## 2023-02-23 DIAGNOSIS — E10.65 TYPE 1 DIABETES MELLITUS WITH HYPERGLYCEMIA: Primary | ICD-10-CM

## 2023-02-23 RX ORDER — GLUCAGON 3 MG/1
1 POWDER NASAL AS NEEDED
Qty: 1 EACH | Refills: 3 | Status: SHIPPED | OUTPATIENT
Start: 2023-02-23

## 2023-03-03 ENCOUNTER — TELEPHONE (OUTPATIENT)
Dept: ENDOCRINOLOGY | Facility: CLINIC | Age: 68
End: 2023-03-03
Payer: MEDICARE

## 2023-03-03 NOTE — TELEPHONE ENCOUNTER
PO form from [employer] Filled out and put in Dr Mahoney's inbox for signature.  Will fax once signed.

## 2023-03-27 DIAGNOSIS — Z12.31 ENCOUNTER FOR SCREENING MAMMOGRAM FOR MALIGNANT NEOPLASM OF BREAST: ICD-10-CM

## 2023-03-28 ENCOUNTER — TELEPHONE (OUTPATIENT)
Dept: FAMILY MEDICINE CLINIC | Age: 68
End: 2023-03-28
Payer: MEDICARE

## 2023-03-28 NOTE — TELEPHONE ENCOUNTER
Hub is instructed to read the documentation below to patient  Called patient. No answer. Per HIPAA, may leave detailed VM. VM left advising pt of mammogram being negative and that Fibroadenoma is not a cancer and that no treatment is needed and to continue regular screening mammograms.

## 2023-04-01 DIAGNOSIS — I10 BENIGN HYPERTENSION: ICD-10-CM

## 2023-04-03 NOTE — TELEPHONE ENCOUNTER
Rx Refill Note    PROTOCOLS NOT MET     Requested Prescriptions     Pending Prescriptions Disp Refills   • amLODIPine (NORVASC) 2.5 MG tablet [Pharmacy Med Name: AMLODIPINE BESYLATE 2.5MG TABLETS] 90 tablet 0     Sig: TAKE 1 TABLET BY MOUTH DAILY      Last office visit with prescribing clinician: 1/31/2023      Next office visit with prescribing clinician: 4/25/2023            Marlin Mondrgaon LPN  04/03/23, 08:11 EDT

## 2023-04-04 RX ORDER — AMLODIPINE BESYLATE 2.5 MG/1
2.5 TABLET ORAL DAILY
Qty: 90 TABLET | Refills: 0 | Status: SHIPPED | OUTPATIENT
Start: 2023-04-04

## 2023-04-26 ENCOUNTER — OFFICE VISIT (OUTPATIENT)
Dept: FAMILY MEDICINE CLINIC | Age: 68
End: 2023-04-26
Payer: MEDICARE

## 2023-04-26 VITALS
TEMPERATURE: 98 F | OXYGEN SATURATION: 97 % | HEIGHT: 62 IN | BODY MASS INDEX: 25.58 KG/M2 | HEART RATE: 75 BPM | DIASTOLIC BLOOD PRESSURE: 64 MMHG | RESPIRATION RATE: 16 BRPM | WEIGHT: 139 LBS | SYSTOLIC BLOOD PRESSURE: 130 MMHG

## 2023-04-26 DIAGNOSIS — E78.2 MIXED HYPERLIPIDEMIA: ICD-10-CM

## 2023-04-26 DIAGNOSIS — I10 BENIGN HYPERTENSION: Primary | ICD-10-CM

## 2023-04-26 DIAGNOSIS — N18.5 CKD (CHRONIC KIDNEY DISEASE) STAGE 5, GFR LESS THAN 15 ML/MIN: ICD-10-CM

## 2023-04-26 RX ORDER — DULOXETIN HYDROCHLORIDE 30 MG/1
1 CAPSULE, DELAYED RELEASE ORAL DAILY
COMMUNITY
Start: 2023-04-05

## 2023-04-26 NOTE — PROGRESS NOTES
Na Handy presents to [facility]      Chief Complaint  Hypertension    HPI     Respiratory:  SOA:  no. Exertional dyspnea: no.  Dyspnea at rest: no.  Flights of stairs climbable: 3.  Cardiology:  Chest pain: no.  Dizziness: no. Easily fatigued: no.  Pedal edema: no.  Light-headiness: no.  Symptoms of claudication: no.  Orthopnea: no.  Palpitations: no. Tachycardia: no.  Ophthalmology:  Last eye exam date: 4/2022 (appt next month). Vision changes: none.  Gastroenterology:  Heartburn: no.  Nausea: no.    ROS: As in HPI      Current Outpatient Medications:   •  acetaminophen (TYLENOL) 650 MG 8 hr tablet, Take 1 tablet by mouth Daily As Needed for Mild Pain, Moderate Pain or Headache., Disp: , Rfl:   •  amLODIPine (NORVASC) 2.5 MG tablet, TAKE 1 TABLET BY MOUTH DAILY, Disp: 90 tablet, Rfl: 0  •  Apple Cider Vinegar 500 MG tablet, Take 1 tablet by mouth Daily., Disp: , Rfl:   •  ascorbic acid (VITAMIN C) 1000 MG tablet, Take 1 tablet by mouth Daily., Disp: , Rfl:   •  aspirin 81 MG EC tablet, Take 1 tablet by mouth Daily., Disp: , Rfl:   •  B Complex-Biotin-FA (B Complete) tablet, Take 1 tablet by mouth Daily., Disp: , Rfl:   •  BD Pen Needle Lupe 2nd Gen 32G X 4 MM misc, Pt to use with insulin pens 5 times daily, Disp: 150 each, Rfl: 1  •  Blood Glucose Monitoring Suppl (ONE TOUCH ULTRA 2) w/Device kit, ONETOUCH ULTRA 2 w/Device KIT, Disp: , Rfl:   •  bumetanide (BUMEX) 2 MG tablet, See administration instructions. 4 mg in the AM and 2 mg in the PM, Disp: , Rfl:   •  calcitriol (ROCALTROL) 0.25 MCG capsule, TAKE ONE CAPSULE BY MOUTH DAILY (Patient taking differently: 3 (Three) Times a Week.), Disp: 90 capsule, Rfl: 0  •  cetirizine (zyrTEC) 10 MG tablet, Take 1 tablet by mouth Daily., Disp: , Rfl:   •  Cholecalciferol 50 MCG (2000 UT) capsule, Take 1 tablet by mouth 2 (Two) Times a Day., Disp: , Rfl:   •  Continuous Blood Gluc  (Dexcom G6 ) device, 1 Device Daily.,  Disp: 1 each, Rfl: 1  •  Continuous Blood Gluc Sensor (Dexcom G6 Sensor), Every 10 (Ten) Days., Disp: 2 each, Rfl: 6  •  Continuous Blood Gluc Transmit (Dexcom G6 Transmitter) misc, 1 Device Daily., Disp: 1 each, Rfl: 0  •  Cyanocobalamin (Vitamin B-12) 1000 MCG sublingual tablet, PLACE ONE TABLET UNDER THE TONGUE DAILY, Disp: 100 tablet, Rfl: 3  •  DULoxetine (CYMBALTA) 30 MG capsule, Take 1 capsule by mouth Daily., Disp: , Rfl:   •  ELDERBERRY PO, Take 1 each by mouth Daily., Disp: , Rfl:   •  esomeprazole (nexIUM) 40 MG capsule, Take 1 capsule by mouth Daily., Disp: , Rfl:   •  ferrous sulfate 325 (65 FE) MG tablet, Take 1 tablet by mouth Daily., Disp: , Rfl:   •  gabapentin (NEURONTIN) 100 MG capsule, Take 1 capsule by mouth Daily., Disp: , Rfl:   •  Glucagon (Baqsimi One Pack) 3 MG/DOSE powder, 1 each into the nostril(s) as directed by provider As Needed (Use in case of low blood sugars)., Disp: 1 each, Rfl: 3  •  glucose blood test strip, ONETOUCH ULTRA BLUE STRP, Disp: , Rfl:   •  HumaLOG 100 UNIT/ML injection, INJECT UNDER THE SKIN AS DIRECTED VIA INSULIN PUMP, DO NOT EXCEED 40 UNITS DAILY, Disp: 20 mL, Rfl: 6  •  HYDROcodone-acetaminophen (NORCO) 5-325 MG per tablet, Take 1 tablet by mouth Every 6 (Six) Hours As Needed for Moderate Pain or Severe Pain., Disp: , Rfl:   •  hydroxychloroquine (PLAQUENIL) 200 MG tablet, Take 1 tablet by mouth 2 (Two) Times a Day., Disp: , Rfl:   •  Lancets (onetouch ultrasoft) lancets, ONETOUCH ULTRASOFT LANCETS, Disp: , Rfl:   •  levothyroxine (SYNTHROID, LEVOTHROID) 137 MCG tablet, Take 1 tablet by mouth Daily., Disp: , Rfl:   •  Multiple Vitamins-Minerals (ICAPS AREDS 2 PO), Take 1 each by mouth 2 (Two) Times a Day., Disp: , Rfl:   •  prenatal vitamin (prenatal, CLASSIC, vitamin) tablet, Take 2 tablets by mouth Daily., Disp: , Rfl:   •  rosuvastatin (CRESTOR) 10 MG tablet, TAKE ONE TABLET BY MOUTH ONCE NIGHTLY, Disp: 30 tablet, Rfl: 11  •  Turmeric 500 MG capsule, Take 1  capsule by mouth Daily., Disp: , Rfl:   •  Zinc 50 MG capsule, Take 1 capsule by mouth Daily., Disp: , Rfl:      Allergies   Allergen Reactions   • Azithromycin GI Intolerance and Other (See Comments)     nausea  nausea     • Adhesive Tape Rash   • Lisinopril Cough     Other reaction(s): Cough   • Losartan Cough     Other reaction(s): Cough        Past Medical History:   Diagnosis Date   • Allergies     YEAR ROUND   • Arthritis    • CKD (chronic kidney disease) stage 3, GFR 30-59 ml/min    • Diabetic neuropathy    • GERD (gastroesophageal reflux disease)    • History of tobacco use     2.5 PPD; 3614-2873   • Inflammatory polyarthritis    • Lupus    • Neuropathy    • Primary osteoarthritis    • Retinal hemorrhage     OU - LASER TREATMENT   • Rheumatoid arthritis    • Wrist fracture        Past Surgical History:   Procedure Laterality Date   • ABDOMINAL HYSTERECTOMY     • CARPAL TUNNEL RELEASE Right 2022   • CATARACT EXTRACTION     • COLONOSCOPY  2021    4 POLYPS   • COLONOSCOPY W/ BIOPSIES      POLYPS   • ENDOSCOPY      GERD   • ENDOSCOPY  2020    ESOPHAGEAL HYPERMOTILITY   • ENDOSCOPY  2021   • SINUS SURGERY     • TONSILLECTOMY AND ADENOIDECTOMY          Family History   Problem Relation Age of Onset   • Heart disease Mother    • Diabetes Father    • Hypertension Father    • Heart disease Father    • Diabetes Maternal Grandmother         Social History     Socioeconomic History   • Marital status: Single   • Number of children: 0   • Years of education: 14   Tobacco Use   • Smoking status: Former     Packs/day: 2.50     Years: 24.00     Pack years: 60.00     Types: Cigarettes     Start date:      Quit date:      Years since quittin.3     Passive exposure: Past   • Smokeless tobacco: Never   Vaping Use   • Vaping Use: Never used   Substance and Sexual Activity   • Alcohol use: Not Currently   • Drug use: Defer   • Sexual activity: Defer     "        Objective   Vital Signs:  /64 (BP Location: Left arm, Patient Position: Sitting, Cuff Size: Adult)   Pulse 75   Temp 98 °F (36.7 °C) (Temporal)   Resp 16   Ht 157.5 cm (62\")   Wt 63 kg (139 lb)   SpO2 97%   BMI 25.42 kg/m²   Estimated body mass index is 25.42 kg/m² as calculated from the following:    Height as of this encounter: 157.5 cm (62\").    Weight as of this encounter: 63 kg (139 lb).        Physical Exam  General:  No acute distress, cheerful, well kept, interactive, good energy level.  Cardiac:  Heart regular rate and rhythm without murmur.  Respiratory:  Lungs clear to auscultation bilaterally and excellent air movement throughout.  Pulses:                            Right          Left         Bruit  Carotid                2+              2+           No  Radial                 2+              2+  Abd Ao               Not bounding  Femoral             Not palpable bilaterally  DP                      2+              2+  PT                      2+              2+  Venous:  Absent JVD. Absent lower extremity varicosities.  Dermatology:  Non-dystrophic toe nails. No Integument changes of the toes, feet, ankles, shins.  Lower Extremities:  No leg edema.  Toes are warm and pink    Result Review :                    Assessment and Plan   Diagnoses and all orders for this visit:    1. Benign hypertension (Primary)  Assessment & Plan:  MDM: Stable. Continue present medications without changes.        2. Mixed hyperlipidemia  Assessment & Plan:  MDM: Stable. Continue present medications without changes.      3. CKD (chronic kidney disease) stage 5, GFR less than 15 ml/min  Assessment & Plan:  MDM: Receiving dialysis 3 times a week.  No concerns at this time             Follow Up   Return in about 3 months (around 7/26/2023) for Cor.    Patient was given instructions and counseling regarding her condition or for health maintenance advice. Please see specific information pulled into the AVS if " appropriate.     Rama Brooks MD

## 2023-05-12 ENCOUNTER — TELEPHONE (OUTPATIENT)
Dept: ENDOCRINOLOGY | Facility: CLINIC | Age: 68
End: 2023-05-12
Payer: MEDICARE

## 2023-05-12 NOTE — TELEPHONE ENCOUNTER
LMN form from [employer] Filled out and put in Dr Mahoney's inbox for signature.  Will fax once signed.

## 2023-07-25 ENCOUNTER — OFFICE VISIT (OUTPATIENT)
Dept: FAMILY MEDICINE CLINIC | Age: 68
End: 2023-07-25
Payer: MEDICARE

## 2023-07-25 VITALS
SYSTOLIC BLOOD PRESSURE: 148 MMHG | BODY MASS INDEX: 25.76 KG/M2 | HEIGHT: 62 IN | DIASTOLIC BLOOD PRESSURE: 62 MMHG | TEMPERATURE: 97.5 F | RESPIRATION RATE: 17 BRPM | OXYGEN SATURATION: 96 % | WEIGHT: 140 LBS | HEART RATE: 69 BPM

## 2023-07-25 DIAGNOSIS — N18.5 CKD (CHRONIC KIDNEY DISEASE) STAGE 5, GFR LESS THAN 15 ML/MIN: ICD-10-CM

## 2023-07-25 DIAGNOSIS — E10.21 TYPE 1 DIABETES MELLITUS WITH DIABETIC NEPHROPATHY: ICD-10-CM

## 2023-07-25 DIAGNOSIS — E78.2 MIXED HYPERLIPIDEMIA: ICD-10-CM

## 2023-07-25 DIAGNOSIS — I10 BENIGN HYPERTENSION: Primary | ICD-10-CM

## 2023-07-25 PROCEDURE — 99214 OFFICE O/P EST MOD 30 MIN: CPT | Performed by: FAMILY MEDICINE

## 2023-07-25 PROCEDURE — 1159F MED LIST DOCD IN RCRD: CPT | Performed by: FAMILY MEDICINE

## 2023-07-25 PROCEDURE — 3078F DIAST BP <80 MM HG: CPT | Performed by: FAMILY MEDICINE

## 2023-07-25 PROCEDURE — 1160F RVW MEDS BY RX/DR IN RCRD: CPT | Performed by: FAMILY MEDICINE

## 2023-07-25 PROCEDURE — 3077F SYST BP >= 140 MM HG: CPT | Performed by: FAMILY MEDICINE

## 2023-07-25 NOTE — ASSESSMENT & PLAN NOTE
MDM: Labs from 7/5/23 reviewed. A1c 6.0. Elevated TSH with normal FT4. Patient will discuss elevated Vit D with Dr. Larson and obtain order for diabetic shoes

## 2023-08-02 ENCOUNTER — TELEPHONE (OUTPATIENT)
Dept: ENDOCRINOLOGY | Facility: CLINIC | Age: 68
End: 2023-08-02
Payer: MEDICARE

## 2023-10-17 ENCOUNTER — OFFICE VISIT (OUTPATIENT)
Dept: ENDOCRINOLOGY | Facility: CLINIC | Age: 68
End: 2023-10-17
Payer: MEDICARE

## 2023-10-17 VITALS
WEIGHT: 143 LBS | HEIGHT: 62 IN | BODY MASS INDEX: 26.31 KG/M2 | DIASTOLIC BLOOD PRESSURE: 60 MMHG | SYSTOLIC BLOOD PRESSURE: 140 MMHG

## 2023-10-17 DIAGNOSIS — E78.2 MIXED HYPERLIPIDEMIA: ICD-10-CM

## 2023-10-17 DIAGNOSIS — E11.42 DIABETIC PERIPHERAL NEUROPATHY: ICD-10-CM

## 2023-10-17 DIAGNOSIS — N18.5 CKD (CHRONIC KIDNEY DISEASE) STAGE 5, GFR LESS THAN 15 ML/MIN: ICD-10-CM

## 2023-10-17 DIAGNOSIS — E03.9 ACQUIRED HYPOTHYROIDISM: ICD-10-CM

## 2023-10-17 DIAGNOSIS — I10 BENIGN HYPERTENSION: ICD-10-CM

## 2023-10-17 DIAGNOSIS — E10.65 TYPE 1 DIABETES MELLITUS WITH HYPERGLYCEMIA: Primary | ICD-10-CM

## 2023-10-17 PROCEDURE — 1160F RVW MEDS BY RX/DR IN RCRD: CPT | Performed by: INTERNAL MEDICINE

## 2023-10-17 PROCEDURE — 3078F DIAST BP <80 MM HG: CPT | Performed by: INTERNAL MEDICINE

## 2023-10-17 PROCEDURE — 99214 OFFICE O/P EST MOD 30 MIN: CPT | Performed by: INTERNAL MEDICINE

## 2023-10-17 PROCEDURE — 1159F MED LIST DOCD IN RCRD: CPT | Performed by: INTERNAL MEDICINE

## 2023-10-17 PROCEDURE — 95251 CONT GLUC MNTR ANALYSIS I&R: CPT | Performed by: INTERNAL MEDICINE

## 2023-10-17 PROCEDURE — 3077F SYST BP >= 140 MM HG: CPT | Performed by: INTERNAL MEDICINE

## 2023-10-17 RX ORDER — MAGNESIUM 200 MG
1 TABLET ORAL DAILY
Qty: 100 TABLET | Refills: 3 | Status: SHIPPED | OUTPATIENT
Start: 2023-10-17

## 2023-10-17 RX ORDER — LEVOTHYROXINE SODIUM 0.15 MG/1
TABLET ORAL
COMMUNITY
Start: 2023-07-25

## 2023-10-17 RX ORDER — HYDROXYZINE HYDROCHLORIDE 10 MG/1
10 TABLET, FILM COATED ORAL
COMMUNITY

## 2023-10-17 NOTE — PROGRESS NOTES
Endocrine Progress Note Outpatient     Patient Care Team:  Rama Brooks MD as PCP - General (Family Medicine)    Chief Complaint: Follow-up type 1 diabetes    HPI: 68-year-old female with previous diagnosis of type 1 diabetes, hypertension, hyperlipidemia and hypothyroidism is here for follow-up.    For type 1 diabetes: She is currently on the T-slim insulin pump with Dexcom monitoring system.  Current basal rates are as follows midnight 0.47 units/h, at 7 AM is 0.52 units/h.  Insulin carb ratio at midnight is 1 unit for each 15 g of carbohydrate, at 7 AM is 1 unit for each 18 g of carbohydrate.  Insulin correction factor is 1 unit for each 85 mg BS with a target blood sugar of 110.      Since last seen she has not been hospitalized with low or high blood sugars.    Hypertension: Well controlled.     Hyperlipidemia: Currently on Crestor and Zetia.    Hypothyroidism: On levothyroxine supplementation    End-stage renal disease: On hemodialysis now since 10/2022.     Past Medical History:   Diagnosis Date    Allergies     YEAR ROUND    Arthritis     Benign hypertension 2005    Bleeding of eye 2015    Retinal bleed - Laser Treatment    CKD (chronic kidney disease) stage 3, GFR 30-59 ml/min 2014    Diabetes 1995    Diabetic neuropathy 2001    GERD (gastroesophageal reflux disease)     History of tobacco use     2.5 PPD; [id number]    Hyperlipidemia 1995    Hypothyroidism     Inflammatory polyarthritis     Lupus 2005    Neuropathy     Primary osteoarthritis     Retinal hemorrhage 2015    [facility] - LASER TREATMENT    Rheumatoid arthritis 2005    Wrist fracture     Zoster without complications 05/23/2022       Social History     Socioeconomic History    Marital status: Single    Number of children: 0    Years of education: 14   Tobacco Use    Smoking status: Former     Packs/day: 2.50     Years: 24.00     Additional pack years: 0.00     Total pack years: 60.00     Types: Cigarettes     Start date: 1971     Quit date: 1995      Years since quittin.8     Passive exposure: Past    Smokeless tobacco: Never   Vaping Use    Vaping Use: Never used   Substance and Sexual Activity    Alcohol use: Not Currently    Drug use: Defer    Sexual activity: Defer       Family History   Problem Relation Age of Onset    Heart disease Mother     Hypertension Father     Heart disease Father     Diabetes Maternal Grandmother        Allergies   Allergen Reactions    Azithromycin GI Intolerance and Other (See Comments)     nausea  nausea      Adhesive Tape Rash    Lisinopril Cough     Other reaction(s): Cough    Losartan Cough     Other reaction(s): Cough       ROS:   Constitutional:  Denies fatigue, tiredness.    Eyes:  Denies change in visual acuity   HENT:  Denies nasal congestion or sore throat   Respiratory: denies cough, shortness of breath.   Cardiovascular:  denies chest pain, edema   GI:  Denies abdominal pain, nausea, vomiting.   Musculoskeletal:  Denies back pain or joint pain   Integument:  Denies dry skin and rash   Neurologic:  Denies headache, focal weakness or sensory changes   Endocrine:  Denies polyuria or polydipsia   Psychiatric:  Denies depression or anxiety      Vitals:    10/17/23 1443   BP: 140/60     Body mass index is 26.16 kg/m².     Physical Exam:  GEN: NAD, conversant  EYES: EOMI,   NECK: no thyromegaly,  CV: RRR,   LUNG: CTA  PSYCH: AOX3, appropriate mood, affect normal  FEET: Has bilateral bunions    Results Review:     I reviewed the patient's new clinical results.    Dexcom download interpretation: Dates covered was between 2023 to 2023.  Average blood sugar is 183.  She is spending 84% in the range, 15% above range and 1% below range.  Glucose graph does show some postprandial hyperglycemia.    SCANNED - LABS (2023)    Medication Review: Reviewed.       Current Outpatient Medications:     acetaminophen (TYLENOL) 650 MG 8 hr tablet, Take 1 tablet by mouth Daily As Needed for Mild Pain,  Moderate Pain or Headache., Disp: , Rfl:     amLODIPine (NORVASC) 2.5 MG tablet, TAKE 1 TABLET BY MOUTH DAILY, Disp: 90 tablet, Rfl: 0    Apple Cider Vinegar 500 MG tablet, Take 1 tablet by mouth Daily., Disp: , Rfl:     aspirin 81 MG EC tablet, Take 1 tablet by mouth Daily., Disp: , Rfl:     BD Pen Needle Lupe 2nd Gen 32G X 4 MM misc, Pt to use with insulin pens 5 times daily, Disp: 150 each, Rfl: 1    Blood Glucose Monitoring Suppl (ONE TOUCH ULTRA 2) w/Device kit, ONETOUCH ULTRA 2 w/Device KIT, Disp: , Rfl:     bumetanide (BUMEX) 2 MG tablet, See administration instructions. 4 mg in the AM and 2 mg in the PM, Disp: , Rfl:     calcitriol (ROCALTROL) 0.25 MCG capsule, TAKE ONE CAPSULE BY MOUTH DAILY (Patient taking differently: 3 (Three) Times a Week.), Disp: 90 capsule, Rfl: 0    cetirizine (zyrTEC) 10 MG tablet, Take 1 tablet by mouth Daily., Disp: , Rfl:     Cholecalciferol 50 MCG (2000 UT) capsule, Take 1 tablet by mouth 2 (Two) Times a Day., Disp: , Rfl:     Continuous Blood Gluc  (Dexcom G6 ) device, 1 Device Daily., Disp: 1 each, Rfl: 1    Continuous Blood Gluc Sensor (Dexcom G6 Sensor), Every 10 (Ten) Days., Disp: 2 each, Rfl: 6    Continuous Blood Gluc Transmit (Dexcom G6 Transmitter) misc, 1 Device Daily., Disp: 1 each, Rfl: 0    Cyanocobalamin (Vitamin B-12) 1000 MCG sublingual tablet, PLACE ONE TABLET UNDER THE TONGUE DAILY, Disp: 100 tablet, Rfl: 3    DULoxetine (CYMBALTA) 30 MG capsule, Take 1 capsule by mouth Daily., Disp: , Rfl:     ELDERBERRY PO, Take 1 each by mouth Daily., Disp: , Rfl:     esomeprazole (nexIUM) 40 MG capsule, Take 1 capsule by mouth Daily., Disp: , Rfl:     gabapentin (NEURONTIN) 100 MG capsule, Take 1 capsule by mouth Daily., Disp: , Rfl:     Glucagon (Baqsimi One Pack) 3 MG/DOSE powder, 1 each into the nostril(s) as directed by provider As Needed (Use in case of low blood sugars)., Disp: 1 each, Rfl: 3    glucose blood test strip, ONETOUCH ULTRA BLUE STRP, Disp:  , Rfl:     HumaLOG 100 UNIT/ML injection, INJECT UNDER THE SKIN AS DIRECTED VIA INSULIN PUMP, DO NOT EXCEED 40 UNITS DAILY, Disp: 20 mL, Rfl: 6    HYDROcodone-acetaminophen (NORCO) 5-325 MG per tablet, Take 1 tablet by mouth Every 6 (Six) Hours As Needed for Moderate Pain or Severe Pain., Disp: , Rfl:     hydroxychloroquine (PLAQUENIL) 200 MG tablet, Take 1 tablet by mouth 2 (Two) Times a Day., Disp: , Rfl:     hydrOXYzine (ATARAX) 10 MG tablet, Take 1 tablet by mouth., Disp: , Rfl:     Lancets (onetouch ultrasoft) lancets, ONETOUCH ULTRASOFT LANCETS, Disp: , Rfl:     levothyroxine (SYNTHROID, LEVOTHROID) 150 MCG tablet, , Disp: , Rfl:     lidocaine (XYLOCAINE) 5 % ointment, , Disp: , Rfl:     Multiple Vitamins-Minerals (ICAPS AREDS 2 PO), Take 1 each by mouth 2 (Two) Times a Day., Disp: , Rfl:     prenatal vitamin (prenatal, CLASSIC, vitamin) tablet, Take 2 tablets by mouth Daily., Disp: , Rfl:     rosuvastatin (CRESTOR) 10 MG tablet, TAKE ONE TABLET BY MOUTH ONCE NIGHTLY, Disp: 30 tablet, Rfl: 11    Turmeric 500 MG capsule, Take 1 capsule by mouth Daily., Disp: , Rfl:       Assessment & Plan   1.  Diabetes mellitus type 1 with Hyperglycemia: Uncontrolled with some postprandial hyperglycemia, she tells me that she sometimes misses her mealtime boluses but she is now telling me that she will be more careful and better with taking her boluses on regular basis.  We have decided not to make any changes on her insulin pump settings.  She is advised to always keep glucose source in case of low blood sugars.    2.  Hypertension: Well-controlled, continue current management.    3.  Hyperlipidemia: On Crestor and Zetia.    4.  Hypothyroidism: On levothyroxine, no recent labs, will follow TSH and free T4.    5.  End-stage renal disease: On hemodialysis.    6.  Diabetic peripheral neuropathy: She is on Vit D, B12.  She will benefit from diabetic shoes.    Assessment and plan from July 17, 2023 reviewed and updated.               Jo Mahoney MD FACE.    6/8/2021: Addendum: Patient is checking her blood sugars 4 times daily.

## 2023-10-17 NOTE — PATIENT INSTRUCTIONS
Continue current medications  Always keep glucose source in case of low blood sugars  Please take your boluses on a consistent basis before you eat  Labs before follow-up.

## 2023-10-30 DIAGNOSIS — E10.65 TYPE 1 DIABETES MELLITUS WITH HYPERGLYCEMIA: ICD-10-CM

## 2023-10-30 RX ORDER — GLUCAGON 3 MG/1
POWDER NASAL
Qty: 1 EACH | Refills: 3 | Status: SHIPPED | OUTPATIENT
Start: 2023-10-30

## 2023-11-16 ENCOUNTER — OFFICE VISIT (OUTPATIENT)
Dept: FAMILY MEDICINE CLINIC | Facility: CLINIC | Age: 68
End: 2023-11-16
Payer: MEDICARE

## 2023-11-16 VITALS
BODY MASS INDEX: 25.91 KG/M2 | OXYGEN SATURATION: 98 % | HEART RATE: 74 BPM | DIASTOLIC BLOOD PRESSURE: 66 MMHG | SYSTOLIC BLOOD PRESSURE: 144 MMHG | TEMPERATURE: 98.7 F | WEIGHT: 140.8 LBS | RESPIRATION RATE: 17 BRPM | HEIGHT: 62 IN

## 2023-11-16 DIAGNOSIS — J01.00 ACUTE NON-RECURRENT MAXILLARY SINUSITIS: ICD-10-CM

## 2023-11-16 DIAGNOSIS — R05.1 ACUTE COUGH: Primary | ICD-10-CM

## 2023-11-16 DIAGNOSIS — N18.5 CKD (CHRONIC KIDNEY DISEASE) STAGE 5, GFR LESS THAN 15 ML/MIN: ICD-10-CM

## 2023-11-16 LAB
EXPIRATION DATE: NORMAL
EXPIRATION DATE: NORMAL
FLUAV AG UPPER RESP QL IA.RAPID: NOT DETECTED
FLUBV AG UPPER RESP QL IA.RAPID: NOT DETECTED
INTERNAL CONTROL: NORMAL
INTERNAL CONTROL: NORMAL
Lab: NORMAL
Lab: NORMAL
S PYO AG THROAT QL: NEGATIVE
SARS-COV-2 AG UPPER RESP QL IA.RAPID: NOT DETECTED

## 2023-11-16 RX ORDER — BENZONATATE 200 MG/1
200 CAPSULE ORAL 3 TIMES DAILY PRN
Qty: 30 CAPSULE | Refills: 0 | Status: SHIPPED | OUTPATIENT
Start: 2023-11-16

## 2023-11-16 RX ORDER — POLYETHYLENE GLYCOL 3350 17 G/17G
17 POWDER, FOR SOLUTION ORAL DAILY PRN
COMMUNITY
Start: 2023-10-11

## 2023-11-16 RX ORDER — GUAIFENESIN 600 MG/1
1 TABLET, EXTENDED RELEASE ORAL EVERY 12 HOURS SCHEDULED
COMMUNITY
Start: 2023-10-27

## 2023-11-16 RX ORDER — DOXYCYCLINE HYCLATE 100 MG/1
100 CAPSULE ORAL 2 TIMES DAILY
Qty: 20 CAPSULE | Refills: 0 | Status: SHIPPED | OUTPATIENT
Start: 2023-11-16

## 2023-11-16 RX ORDER — LIDOCAINE AND PRILOCAINE 25; 25 MG/G; MG/G
CREAM TOPICAL
COMMUNITY
Start: 2023-10-30 | End: 2023-11-16

## 2023-11-16 NOTE — PROGRESS NOTES
"Chief Complaint  Facial Pain, Earache, Sore Throat, and Cough    History of Present Illness : onset yesterday  Ears:    Pain: no.  Drainage: no  Nose:  Epistaxis: no. Nasal drainage: yes. Nasal congestion: no. Post nasal drip: yes.   Sneezing: no.  Sinusitis:  Facial pain: left maxillary  Headache: no.   Throat:   Soreness: no.  Dysphagia.  Lungs:  Cough: yes.  Pleuritic pain: no.  Smoker: no.  General:  Myalgias: no. Malaise: no.   Fever: no.  Chills: no.   Gastroenterology:  Diarrhea: no. Nausea: transient post dialysis. Vomiting: no      Objective     Vital Signs:   /66 (BP Location: Right arm, Patient Position: Sitting, Cuff Size: Small Adult)   Pulse 74   Temp 98.7 °F (37.1 °C) (Oral)   Resp 17   Ht 157.5 cm (62\")   Wt 63.9 kg (140 lb 12.8 oz)   SpO2 98%   BMI 25.75 kg/m²   Current Outpatient Medications on File Prior to Visit   Medication Sig Dispense Refill    acetaminophen (TYLENOL) 650 MG 8 hr tablet Take 1 tablet by mouth Daily As Needed for Mild Pain, Moderate Pain or Headache.      amLODIPine (NORVASC) 2.5 MG tablet TAKE 1 TABLET BY MOUTH DAILY 90 tablet 0    Apple Cider Vinegar 500 MG tablet Take 1 tablet by mouth Daily.      aspirin 81 MG EC tablet Take 1 tablet by mouth Daily.      Baqsimi One Pack 3 MG/DOSE powder USE ONE SPRAY IN THE NOSTRILS AS DIRECTED BY THE PROVIDER IN THE CASE OF LOW BLOOD SUGARS 1 each 3    BD Pen Needle Lupe 2nd Gen 32G X 4 MM misc Pt to use with insulin pens 5 times daily 150 each 1    Blood Glucose Monitoring Suppl (ONE TOUCH ULTRA 2) w/Device kit ONETOUCH ULTRA 2 w/Device KIT      bumetanide (BUMEX) 2 MG tablet See administration instructions. 4 mg in the AM and 2 mg in the PM      calcitriol (ROCALTROL) 0.25 MCG capsule TAKE ONE CAPSULE BY MOUTH DAILY (Patient taking differently: 3 (Three) Times a Week.) 90 capsule 0    cetirizine (zyrTEC) 10 MG tablet Take 1 tablet by mouth Daily.      Cholecalciferol 50 MCG (2000 UT) capsule Take 1 tablet by mouth Daily.   "    Continuous Blood Gluc  (Dexcom G6 ) device 1 Device Daily. 1 each 1    Continuous Blood Gluc Sensor (Dexcom G6 Sensor) Every 10 (Ten) Days. 2 each 6    Continuous Blood Gluc Transmit (Dexcom G6 Transmitter) misc 1 Device Daily. 1 each 0    Cyanocobalamin (Vitamin B-12) 1000 MCG sublingual tablet Place 1 tablet under the tongue Daily. 100 tablet 3    DULoxetine (CYMBALTA) 30 MG capsule Take 1 capsule by mouth Daily.      ELDERBERRY PO Take 1 each by mouth Daily.      esomeprazole (nexIUM) 40 MG capsule Take 1 capsule by mouth Daily.      gabapentin (NEURONTIN) 100 MG capsule Take 1 capsule by mouth Daily.      glucose blood test strip ONETOUCH ULTRA BLUE STRP      guaiFENesin (MUCINEX) 600 MG 12 hr tablet Take 1 tablet by mouth Every 12 (Twelve) Hours.      HumaLOG 100 UNIT/ML injection INJECT UNDER THE SKIN AS DIRECTED VIA INSULIN PUMP, DO NOT EXCEED 40 UNITS DAILY 20 mL 6    HYDROcodone-acetaminophen (NORCO) 5-325 MG per tablet Take 1 tablet by mouth Every 6 (Six) Hours As Needed for Moderate Pain or Severe Pain.      hydroxychloroquine (PLAQUENIL) 200 MG tablet Take 1 tablet by mouth 2 (Two) Times a Day.      Lancets (onetouch ultrasoft) lancets ONETOUCH ULTRASOFT LANCETS      levothyroxine (SYNTHROID, LEVOTHROID) 150 MCG tablet       lidocaine (XYLOCAINE) 5 % ointment       Multiple Vitamins-Minerals (ICAPS AREDS 2 PO) Take 1 each by mouth 2 (Two) Times a Day.      polyethylene glycol (MIRALAX) 17 g packet Take 17 g by mouth Daily As Needed.      prenatal vitamin (prenatal, CLASSIC, vitamin) tablet Take 2 tablets by mouth Daily.      rosuvastatin (CRESTOR) 10 MG tablet TAKE ONE TABLET BY MOUTH ONCE NIGHTLY 30 tablet 11    hydrOXYzine (ATARAX) 10 MG tablet Take 1 tablet by mouth.      [DISCONTINUED] lidocaine-prilocaine (EMLA) 2.5-2.5 % cream APPLY SMALL AMOUNT TOPICALLY TO SKIN AS DIRECTED 1 HOURS BEFORE APPT (Patient not taking: Reported on 11/16/2023)      [DISCONTINUED] Turmeric 500 MG  capsule Take 1 capsule by mouth Daily.       No current facility-administered medications on file prior to visit.        Past Medical History:   Diagnosis Date    Allergies     YEAR ROUND    Arthritis     Benign hypertension 2005    Bleeding of eye 2015    Retinal bleed - Laser Treatment    CKD (chronic kidney disease) stage 3, GFR 30-59 ml/min     Diabetes     Diabetic neuropathy     GERD (gastroesophageal reflux disease)     History of tobacco use     2.5 PPD; [id number]    Hyperlipidemia     Hypothyroidism     Inflammatory polyarthritis     Lupus     Neuropathy     Primary osteoarthritis     Retinal hemorrhage 2015    [facility] - LASER TREATMENT    Rheumatoid arthritis 2005    Wrist fracture     Zoster without complications 2022      Past Surgical History:   Procedure Laterality Date    ABDOMINAL HYSTERECTOMY  1993    ARTERIOVENOUS FISTULA REPAIR  2023    CARPAL TUNNEL RELEASE Right 2022    CATARACT EXTRACTION  2015    COLONOSCOPY  2021    4 POLYPS    COLONOSCOPY W/ BIOPSIES      POLYPS    ENDOSCOPY      GERD    ENDOSCOPY  2020    ESOPHAGEAL HYPERMOTILITY    ENDOSCOPY  2021    RETINAL LASER PROCEDURE  2015    [facility] - Retinal Bleed    SINUS SURGERY  1980    TONSILLECTOMY AND ADENOIDECTOMY  1984      Family History   Problem Relation Age of Onset    Heart disease Mother     Hypertension Father     Heart disease Father     Diabetes Maternal Grandmother       Social History     Socioeconomic History    Marital status: Single    Number of children: 0    Years of education: 14   Tobacco Use    Smoking status: Former     Packs/day: 2.50     Years: 24.00     Additional pack years: 0.00     Total pack years: 60.00     Types: Cigarettes     Start date:      Quit date:      Years since quittin.8     Passive exposure: Past    Smokeless tobacco: Never   Vaping Use    Vaping Use: Never used   Substance and Sexual Activity    Alcohol use: Not Currently    Drug use: Defer     Sexual activity: Defer         No visits with results within 3 Month(s) from this visit.   Latest known visit with results is:   Office Visit on 01/24/2023   Component Date Value Ref Range Status    Age Gdln ACOG Testing 01/24/2023 Comment   Final    <21 or >65 or no age provided    Diagnosis 01/24/2023 Comment   Final    NEGATIVE FOR INTRAEPITHELIAL LESION OR MALIGNANCY.  CELLULAR CHANGES ASSOCIATED WITH ATROPHY ARE PRESENT.    Specimen adequacy: 01/24/2023 Comment   Final    Satisfactory for evaluation.  Endocervical component may not be  distinguished in cases of atrophy.    Clinician Provided ICD-10: 01/24/2023 Comment   Final    Z01.419    Performed by: 01/24/2023 Comment   Final    Mirian Olvia, Cytotechnologist (ASCP)    . 01/24/2023 .   Final    Note: 01/24/2023 Comment   Final    The Pap smear is a screening test designed to aid in the detection of  premalignant and malignant conditions of the uterine cervix.  It is not a  diagnostic procedure and should not be used as the sole means of detecting  cervical cancer.  Both false-positive and false-negative reports do occur.    Method: 01/24/2023 Comment   Final    This liquid based ThinPrep(R) pap test was screened with the  use of an image guided system.         Physical Exam   General:  No acute distress,  good energy level, interactive, cooperative with exam.  Eyes:  Normal.  Ears:  Canals: normal  TM: intact, no erythema, air fluid level, bulging, dilated vessels  Nose:  Breathing comfortably through the nose. Scant clear mucous. Normal pink mucosa, 2+ edema bilaterally.  Sinuses:  Frontal: non-tender  Maxillary: non-tender right, tender left  Buccal Cavity:  Moist membranes  No oral lesions  Throat:  No mucous draining down the posterior oropharyngeal wall. No erythema. No exudate.  No laryngitis  Lungs:  Clear to auscultation bilaterally, excellent air movement throughout  Voice strong and clear  Cor:  Heart regular rate and rhythm without murmur    Abdomen:  Bowel sounds are normal pitch and frequency x 4 quadrants  No tenderness. No palpable mass.  Lymph nodes:  No enlarged anterior or posterior cervical lymph nodes  Neck:  Supple.  No palpable mass  Integument:  Warm, dry, pink without exanthema     Result Review  Data Reviewed:{ Labs  Result Review  Imaging  Med Tab  Media :23}                     Assessment and Plan {CC Problem List  Visit Diagnosis  ROS  Review (Popup)  Health Maintenance  Quality  BestPractice  Medications  SmartSets  SnapShot Encounters  Media :23}   Diagnoses and all orders for this visit:    1. Acute cough (Primary)  Comments:  MDM: secondary to post nasal drainage. Saline nasal rinse  Orders:  -     POCT rapid strep A  -     POCT SARS-CoV-2 Antigen GOKUL + Flu  -     benzonatate (TESSALON) 200 MG capsule; Take 1 capsule by mouth 3 (Three) Times a Day As Needed for Cough.  Dispense: 30 capsule; Refill: 0    2. Acute non-recurrent maxillary sinusitis  Comments:  MDM: start antibiotic  Orders:  -     doxycycline (VIBRAMYCIN) 100 MG capsule; Take 1 capsule by mouth 2 (Two) Times a Day.  Dispense: 20 capsule; Refill: 0    3. CKD (chronic kidney disease) stage 5, GFR less than 15 ml/min  Comments:  MDM: Continue dialysis          Follow Up {Instructions Charge Capture  Follow-up Communications :23}     Patient was given instructions and counseling regarding her condition or for health maintenance advice. Please see specific information pulled into the AVS (placed there by myself) if appropriate.    Return if symptoms worsen or fail to improve.    MDM       Rama Brooks MD

## 2023-11-27 ENCOUNTER — TELEPHONE (OUTPATIENT)
Dept: FAMILY MEDICINE CLINIC | Facility: CLINIC | Age: 68
End: 2023-11-27

## 2023-11-27 RX ORDER — ROSUVASTATIN CALCIUM 10 MG/1
10 TABLET, COATED ORAL NIGHTLY
Qty: 90 TABLET | Refills: 3 | Status: SHIPPED | OUTPATIENT
Start: 2023-11-27

## 2023-11-27 NOTE — TELEPHONE ENCOUNTER
PATIENT CALLED STATING THAT THE ANTIBIOTICS THAT SHE WAS GIVEN DURING HER APPOINTMENT ON 11/16/23 HAVE NOT HELPED TO IMPROVE HER SYMPTOMS. SHE STILL HAS CLOGGED EARS AND A COUGH.    PATIENT WOULD LIKE TO KNOW HOW DR. FORTUNE RECOMMENDS PROCEEDING.    PLEASE ADVISE  [phone]

## 2024-01-01 NOTE — PROGRESS NOTES
Assessment:     Normal weight gain.    Jenni has regained birth weight.     Plan:     1. Feeding guidance discussed.    2. Follow-up visit in 2 weeks for next well child visit or weight check, or sooner as needed.         Subjective:      History was provided by the parents.    Jenni Brady is a 2 wk.o. female who was brought in for this  weight check visit.    Current Issues:  Current concerns include: weight.    Review of Nutrition:  Current diet: breast milk  Current feeding patterns: every 2-3 hours  Difficulties with feeding? Much better had frenotomy on Tuesday at [facility]  Current stooling frequency: peeing with every feed, having good bms }      Objective:    Baby gained fantastic weight, over 5% since last weight taken and is right at birth weight. Frenotomy significantly helped with feedings and getting more volume in.     Has follow up with baby and me Tuesday and will f/u with us at 1 month well visit or sooner if needed.      Na Handy presents to [facility]      Chief Complaint  Hypertension      HPI     Respiratory:  SOA:  no. Exertional dyspnea: no.  Dyspnea at rest: no.  Flights of stairs climbable: 3.  Cardiology:  Chest pain: no.  Dizziness: no. Easily fatigued: no.  Pedal edema: no.  Light-headiness: no.  Symptoms of claudication: no.  Orthopnea: no.  Palpitations: no. Tachycardia: no.  Ophthalmology:  Last eye exam date: 06/2023 . Vision changes: none.  Gastroenterology:  Heartburn: no.  Nausea: no.    ROS: As in HPI      Current Outpatient Medications:     acetaminophen (TYLENOL) 650 MG 8 hr tablet, Take 1 tablet by mouth Daily As Needed for Mild Pain, Moderate Pain or Headache., Disp: , Rfl:     amLODIPine (NORVASC) 2.5 MG tablet, TAKE 1 TABLET BY MOUTH DAILY, Disp: 90 tablet, Rfl: 0    Apple Cider Vinegar 500 MG tablet, Take 1 tablet by mouth Daily., Disp: , Rfl:     ascorbic acid (VITAMIN C) 1000 MG tablet, Take 1 tablet by mouth Daily., Disp: , Rfl:     aspirin 81 MG EC tablet, Take 1 tablet by mouth Daily., Disp: , Rfl:     B Complex-Biotin-FA (B Complete) tablet, Take 1 tablet by mouth Daily., Disp: , Rfl:     BD Pen Needle Lupe 2nd Gen 32G X 4 MM misc, Pt to use with insulin pens 5 times daily, Disp: 150 each, Rfl: 1    Blood Glucose Monitoring Suppl (ONE TOUCH ULTRA 2) w/Device kit, ONETOUCH ULTRA 2 w/Device KIT, Disp: , Rfl:     bumetanide (BUMEX) 2 MG tablet, See administration instructions. 4 mg in the AM and 2 mg in the PM, Disp: , Rfl:     calcitriol (ROCALTROL) 0.25 MCG capsule, TAKE ONE CAPSULE BY MOUTH DAILY (Patient taking differently: 3 (Three) Times a Week.), Disp: 90 capsule, Rfl: 0    cetirizine (zyrTEC) 10 MG tablet, Take 1 tablet by mouth Daily., Disp: , Rfl:     Cholecalciferol 50 MCG (2000 UT) capsule, Take 1 tablet by mouth 2 (Two) Times a Day., Disp: , Rfl:     Continuous Blood Gluc  (Dexcom G6 ) device, 1 Device Daily., Disp: 1 each, Rfl: 1     Continuous Blood Gluc Sensor (Dexcom G6 Sensor), Every 10 (Ten) Days., Disp: 2 each, Rfl: 6    Continuous Blood Gluc Transmit (Dexcom G6 Transmitter) misc, 1 Device Daily., Disp: 1 each, Rfl: 0    Cyanocobalamin (Vitamin B-12) 1000 MCG sublingual tablet, PLACE ONE TABLET UNDER THE TONGUE DAILY, Disp: 100 tablet, Rfl: 3    DULoxetine (CYMBALTA) 30 MG capsule, Take 1 capsule by mouth Daily., Disp: , Rfl:     ELDERBERRY PO, Take 1 each by mouth Daily., Disp: , Rfl:     esomeprazole (nexIUM) 40 MG capsule, Take 1 capsule by mouth Daily., Disp: , Rfl:     ferrous sulfate 325 (65 FE) MG tablet, Take 1 tablet by mouth Daily., Disp: , Rfl:     gabapentin (NEURONTIN) 100 MG capsule, Take 1 capsule by mouth Daily., Disp: , Rfl:     Glucagon (Baqsimi One Pack) 3 MG/DOSE powder, 1 each into the nostril(s) as directed by provider As Needed (Use in case of low blood sugars)., Disp: 1 each, Rfl: 3    glucose blood test strip, ONETOUCH ULTRA BLUE STRP, Disp: , Rfl:     HumaLOG 100 UNIT/ML injection, INJECT UNDER THE SKIN AS DIRECTED VIA INSULIN PUMP, DO NOT EXCEED 40 UNITS DAILY, Disp: 20 mL, Rfl: 6    HYDROcodone-acetaminophen (NORCO) 5-325 MG per tablet, Take 1 tablet by mouth Every 6 (Six) Hours As Needed for Moderate Pain or Severe Pain., Disp: , Rfl:     hydroxychloroquine (PLAQUENIL) 200 MG tablet, Take 1 tablet by mouth 2 (Two) Times a Day., Disp: , Rfl:     Lancets (onetouch ultrasoft) lancets, ONETOUCH ULTRASOFT LANCETS, Disp: , Rfl:     levothyroxine (SYNTHROID, LEVOTHROID) 137 MCG tablet, Take 1 tablet by mouth Daily., Disp: , Rfl:     lidocaine (XYLOCAINE) 5 % ointment, , Disp: , Rfl:     Multiple Vitamins-Minerals (ICAPS AREDS 2 PO), Take 1 each by mouth 2 (Two) Times a Day., Disp: , Rfl:     prenatal vitamin (prenatal, CLASSIC, vitamin) tablet, Take 2 tablets by mouth Daily., Disp: , Rfl:     rosuvastatin (CRESTOR) 10 MG tablet, TAKE ONE TABLET BY MOUTH ONCE NIGHTLY, Disp: 30 tablet, Rfl: 11    Turmeric 500 MG  capsule, Take 1 capsule by mouth Daily., Disp: , Rfl:     Zinc 50 MG capsule, Take 1 capsule by mouth Daily., Disp: , Rfl:      Allergies   Allergen Reactions    Azithromycin GI Intolerance and Other (See Comments)     nausea  nausea      Adhesive Tape Rash    Lisinopril Cough     Other reaction(s): Cough    Losartan Cough     Other reaction(s): Cough        Past Medical History:   Diagnosis Date    Allergies     YEAR ROUND    Arthritis     Benign hypertension 2005    Bleeding of eye 2015    Retinal bleed - Laser Treatment    CKD (chronic kidney disease) stage 3, GFR 30-59 ml/min 2014    Diabetes 1995    Diabetic neuropathy 2001    GERD (gastroesophageal reflux disease)     History of tobacco use     2.5 PPD; 6038-4371    Hyperlipidemia 1995    Hypothyroidism     Inflammatory polyarthritis     Lupus 2005    Neuropathy     Primary osteoarthritis     Retinal hemorrhage 2015    [facility] - LASER TREATMENT    Rheumatoid arthritis 2005    Wrist fracture     Zoster without complications 05/23/2022       Past Surgical History:   Procedure Laterality Date    ABDOMINAL HYSTERECTOMY  1993    ARTERIOVENOUS FISTULA REPAIR  06/23/2023    CARPAL TUNNEL RELEASE Right 05/2022    CATARACT EXTRACTION  2015    COLONOSCOPY  04/2021    4 POLYPS    COLONOSCOPY W/ BIOPSIES  2008    POLYPS    ENDOSCOPY  2008    GERD    ENDOSCOPY  06/2020    ESOPHAGEAL HYPERMOTILITY    ENDOSCOPY  04/2021    RETINAL LASER PROCEDURE  2015    [facility] - Retinal Bleed    SINUS SURGERY  1980    TONSILLECTOMY AND ADENOIDECTOMY  1984        Family History   Problem Relation Age of Onset    Heart disease Mother     Hypertension Father     Heart disease Father     Diabetes Maternal Grandmother         Social History     Socioeconomic History    Marital status: Single    Number of children: 0    Years of education: 14   Tobacco Use    Smoking status: Former     Packs/day: 2.50     Years: 24.00     Pack years: 60.00     Types: Cigarettes     Start date: 1971     Quit date: 1995     " Years since quittin.5     Passive exposure: Past    Smokeless tobacco: Never   Vaping Use    Vaping Use: Never used   Substance and Sexual Activity    Alcohol use: Not Currently    Drug use: Defer    Sexual activity: Defer            Objective   Vital Signs:  /62 (BP Location: Right arm, Patient Position: Sitting, Cuff Size: Adult)   Pulse 69   Temp 97.5 °F (36.4 °C) (Temporal)   Resp 17   Ht 157.5 cm (62\")   Wt 63.5 kg (140 lb)   SpO2 96%   BMI 25.61 kg/m²   Estimated body mass index is 25.61 kg/m² as calculated from the following:    Height as of this encounter: 157.5 cm (62\").    Weight as of this encounter: 63.5 kg (140 lb).        Physical Exam  General:  No acute distress, cheerful, well kept, interactive, good energy level.  Cardiac:  Heart regular rate and rhythm without murmur.  Respiratory:  Lungs clear to auscultation bilaterally and excellent air movement throughout.  Pulses:                            Right          Left         Bruit  Carotid                2+              2+           No  Radial                 2+              2+  Abd Ao               Not bounding  Femoral             Not palpable bilaterally  DP                      2+              2+  PT                      2+              2+  Venous:  Absent JVD. Absent lower extremity varicosities.  Dermatology:  Non-dystrophic toe nails. No Integument changes of the toes, feet, ankles, shins.  Lower Extremities:  No leg edema.  Toes are warm and pink    Result Review :                    Assessment and Plan   Diagnoses and all orders for this visit:    1. Benign hypertension (Primary)  Assessment & Plan:  MDM: Stable. Continue present medications without changes.        2. Mixed hyperlipidemia  Assessment & Plan:  MDM: Stable. Continue present medications without changes.      3. CKD (chronic kidney disease) stage 5, GFR less than 15 ml/min  Assessment & Plan:  MDM: Continue renal dialysis      4. Type 1 diabetes mellitus with " diabetic nephropathy  Assessment & Plan:  MDM: Labs from 7/5/23 reviewed. A1c 6.0. Elevated TSH with normal FT4. Patient will discuss elevated Vit D with Dr. Larson and obtain order for diabetic shoes               Follow Up   Return in about 5 months (around 12/25/2023) for Cor.    Patient was given instructions and counseling regarding her condition or for health maintenance advice. Please see specific information pulled into the AVS if appropriate.     Rama Brooks MD

## 2024-01-02 ENCOUNTER — OFFICE VISIT (OUTPATIENT)
Dept: FAMILY MEDICINE CLINIC | Facility: CLINIC | Age: 69
End: 2024-01-02
Payer: MEDICARE

## 2024-01-02 VITALS
RESPIRATION RATE: 16 BRPM | BODY MASS INDEX: 26.57 KG/M2 | DIASTOLIC BLOOD PRESSURE: 76 MMHG | WEIGHT: 144.4 LBS | SYSTOLIC BLOOD PRESSURE: 148 MMHG | OXYGEN SATURATION: 99 % | TEMPERATURE: 98 F | HEART RATE: 77 BPM | HEIGHT: 62 IN

## 2024-01-02 DIAGNOSIS — I10 BENIGN HYPERTENSION: ICD-10-CM

## 2024-01-02 DIAGNOSIS — J00 ACUTE RHINITIS: Primary | ICD-10-CM

## 2024-01-02 DIAGNOSIS — U07.1 COVID-19 VIRUS DETECTED: ICD-10-CM

## 2024-01-02 LAB
EXPIRATION DATE: ABNORMAL
FLUAV AG UPPER RESP QL IA.RAPID: NOT DETECTED
FLUBV AG UPPER RESP QL IA.RAPID: NOT DETECTED
INTERNAL CONTROL: ABNORMAL
Lab: ABNORMAL
SARS-COV-2 AG UPPER RESP QL IA.RAPID: DETECTED

## 2024-01-02 PROCEDURE — 3078F DIAST BP <80 MM HG: CPT | Performed by: FAMILY MEDICINE

## 2024-01-02 PROCEDURE — 87428 SARSCOV & INF VIR A&B AG IA: CPT | Performed by: FAMILY MEDICINE

## 2024-01-02 PROCEDURE — 99214 OFFICE O/P EST MOD 30 MIN: CPT | Performed by: FAMILY MEDICINE

## 2024-01-02 PROCEDURE — 3077F SYST BP >= 140 MM HG: CPT | Performed by: FAMILY MEDICINE

## 2024-01-02 PROCEDURE — 1160F RVW MEDS BY RX/DR IN RCRD: CPT | Performed by: FAMILY MEDICINE

## 2024-01-02 PROCEDURE — 1159F MED LIST DOCD IN RCRD: CPT | Performed by: FAMILY MEDICINE

## 2024-01-02 RX ORDER — LEVOTHYROXINE SODIUM 0.15 MG/1
TABLET ORAL
Status: CANCELLED | OUTPATIENT
Start: 2024-01-02

## 2024-01-02 RX ORDER — AMLODIPINE BESYLATE 2.5 MG/1
2.5 TABLET ORAL DAILY
Qty: 90 TABLET | Refills: 0 | Status: SHIPPED | OUTPATIENT
Start: 2024-01-02

## 2024-01-02 RX ORDER — GUAIFENESIN 600 MG/1
600 TABLET, EXTENDED RELEASE ORAL 2 TIMES DAILY PRN
Qty: 30 TABLET | Refills: 0 | Status: SHIPPED | OUTPATIENT
Start: 2024-01-02

## 2024-01-02 NOTE — PROGRESS NOTES
Na Handy presents to [facility]      Chief Complaint  HTN    HPI     Respiratory:  SOA:  no. Exertional dyspnea: no.  Dyspnea at rest: no.  Flights of stairs climbable: 3.  Cardiology:  Chest pain: no.  Dizziness: no. Easily fatigued: no.  Pedal edema: no.  Light-headiness: no.  Symptoms of claudication: no.  Orthopnea: no.  Palpitations: no. Tachycardia: no.  Ophthalmology:  Last eye exam date: 06/2023 . Vision changes: none.  Gastroenterology:  Heartburn: no.  Nausea: no.    Requesting refill on Mucinex.  Doing well with renal dialysis.  Labs at [facility] pending 1/8/24 by endocrinologist.  Recent COVID.  Requesting retesting.  No prior symptoms of illness and no symptoms, today    Current Outpatient Medications:     acetaminophen (TYLENOL) 650 MG 8 hr tablet, Take 1 tablet by mouth Daily As Needed for Mild Pain, Moderate Pain or Headache., Disp: , Rfl:     amLODIPine (NORVASC) 2.5 MG tablet, Take 1 tablet by mouth Daily., Disp: 90 tablet, Rfl: 0    Apple Cider Vinegar 500 MG tablet, Take 1 tablet by mouth Daily., Disp: , Rfl:     aspirin 81 MG EC tablet, Take 1 tablet by mouth Daily., Disp: , Rfl:     Baqsimi One Pack 3 MG/DOSE powder, USE ONE SPRAY IN THE NOSTRILS AS DIRECTED BY THE PROVIDER IN THE CASE OF LOW BLOOD SUGARS, Disp: 1 each, Rfl: 3    BD Pen Needle Lupe 2nd Gen 32G X 4 MM misc, Pt to use with insulin pens 5 times daily, Disp: 150 each, Rfl: 1    Blood Glucose Monitoring Suppl (ONE TOUCH ULTRA 2) w/Device kit, ONETOUCH ULTRA 2 w/Device KIT, Disp: , Rfl:     bumetanide (BUMEX) 2 MG tablet, See administration instructions. 4 mg in the AM and 2 mg in the PM, Disp: , Rfl:     calcitriol (ROCALTROL) 0.25 MCG capsule, TAKE ONE CAPSULE BY MOUTH DAILY (Patient taking differently: 3 (Three) Times a Week.), Disp: 90 capsule, Rfl: 0    cetirizine (zyrTEC) 10 MG tablet, Take 1 tablet by mouth Daily., Disp: , Rfl:     Cholecalciferol 50 MCG (2000 UT) capsule, Take 1 tablet by mouth  Daily., Disp: , Rfl:     Continuous Blood Gluc  (Dexcom G6 ) device, 1 Device Daily., Disp: 1 each, Rfl: 1    Continuous Blood Gluc Sensor (Dexcom G6 Sensor), Every 10 (Ten) Days., Disp: 2 each, Rfl: 6    Continuous Blood Gluc Transmit (Dexcom G6 Transmitter) misc, 1 Device Daily., Disp: 1 each, Rfl: 0    Cyanocobalamin (Vitamin B-12) 1000 MCG sublingual tablet, Place 1 tablet under the tongue Daily., Disp: 100 tablet, Rfl: 3    DULoxetine (CYMBALTA) 30 MG capsule, Take 1 capsule by mouth Daily., Disp: , Rfl:     ELDERBERRY PO, Take 1 each by mouth Daily., Disp: , Rfl:     esomeprazole (nexIUM) 40 MG capsule, Take 1 capsule by mouth Daily., Disp: , Rfl:     gabapentin (NEURONTIN) 100 MG capsule, Take 1 capsule by mouth Daily., Disp: , Rfl:     glucose blood test strip, ONETOUCH ULTRA BLUE STRP, Disp: , Rfl:     guaiFENesin (MUCINEX) 600 MG 12 hr tablet, Take 1 tablet by mouth 2 (Two) Times a Day As Needed for Cough., Disp: 30 tablet, Rfl: 0    HumaLOG 100 UNIT/ML injection, INJECT UNDER THE SKIN AS DIRECTED VIA INSULIN PUMP, DO NOT EXCEED 40 UNITS DAILY, Disp: 20 mL, Rfl: 6    hydroxychloroquine (PLAQUENIL) 200 MG tablet, Take 1 tablet by mouth 2 (Two) Times a Day., Disp: , Rfl:     Lancets (onetouch ultrasoft) lancets, ONETOUCH ULTRASOFT LANCETS, Disp: , Rfl:     levothyroxine (SYNTHROID, LEVOTHROID) 150 MCG tablet, , Disp: , Rfl:     lidocaine (XYLOCAINE) 5 % ointment, , Disp: , Rfl:     Multiple Vitamins-Minerals (ICAPS AREDS 2 PO), Take 1 each by mouth 2 (Two) Times a Day., Disp: , Rfl:     polyethylene glycol (MIRALAX) 17 g packet, Take 17 g by mouth Daily As Needed., Disp: , Rfl:     prenatal vitamin (prenatal, CLASSIC, vitamin) tablet, Take 2 tablets by mouth Daily., Disp: , Rfl:     rosuvastatin (CRESTOR) 10 MG tablet, TAKE 1 TABLET BY MOUTH ONCE NIGHTLY, Disp: 90 tablet, Rfl: 3    hydrOXYzine (ATARAX) 10 MG tablet, Take 1 tablet by mouth., Disp: , Rfl:      Allergies   Allergen Reactions     Azithromycin GI Intolerance and Other (See Comments)     nausea  nausea      Adhesive Tape Rash    Lisinopril Cough     Other reaction(s): Cough    Losartan Cough     Other reaction(s): Cough        Past Medical History:   Diagnosis Date    Allergies     YEAR ROUND    Arthritis     Benign hypertension     Bleeding of eye 2015    Retinal bleed - Laser Treatment    CKD (chronic kidney disease) stage 3, GFR 30-59 ml/min     Diabetes     Diabetic neuropathy     GERD (gastroesophageal reflux disease)     History of tobacco use     2.5 PPD; [id number]    Hyperlipidemia     Hypothyroidism     Inflammatory polyarthritis     Lupus     Neuropathy     Primary osteoarthritis     Retinal hemorrhage 2015    [facility] - LASER TREATMENT    Rheumatoid arthritis 2005    Wrist fracture     Zoster without complications 2022       Past Surgical History:   Procedure Laterality Date    ABDOMINAL HYSTERECTOMY  1993    ARTERIOVENOUS FISTULA REPAIR  2023    CARPAL TUNNEL RELEASE Right 2022    CATARACT EXTRACTION      COLONOSCOPY  2021    4 POLYPS    COLONOSCOPY W/ BIOPSIES      POLYPS    ENDOSCOPY      GERD    ENDOSCOPY  2020    ESOPHAGEAL HYPERMOTILITY    ENDOSCOPY  2021    RETINAL LASER PROCEDURE      [facility] - Retinal Bleed    SINUS SURGERY  1980    TONSILLECTOMY AND ADENOIDECTOMY  1984        Family History   Problem Relation Age of Onset    Heart disease Mother     Hypertension Father     Heart disease Father     Diabetes Maternal Grandmother         Social History     Socioeconomic History    Marital status: Single    Number of children: 0    Years of education: 14   Tobacco Use    Smoking status: Former     Packs/day: 2.50     Years: 24.00     Additional pack years: 0.00     Total pack years: 60.00     Types: Cigarettes     Start date:      Quit date:      Years since quittin.0     Passive exposure: Past    Smokeless tobacco: Never   Vaping Use    Vaping Use: Never used  "  Substance and Sexual Activity    Alcohol use: Not Currently    Drug use: Defer    Sexual activity: Defer            Objective   Vital Signs:  /76 (BP Location: Right arm, Patient Position: Sitting, Cuff Size: Adult)   Pulse 77   Temp 98 °F (36.7 °C) (Infrared)   Resp 16   Ht 157.5 cm (62\")   Wt 65.5 kg (144 lb 6.4 oz)   SpO2 99%   BMI 26.41 kg/m²   Estimated body mass index is 26.41 kg/m² as calculated from the following:    Height as of this encounter: 157.5 cm (62\").    Weight as of this encounter: 65.5 kg (144 lb 6.4 oz).        Physical Exam  General:  No acute distress, cheerful, well kept, interactive, good energy level.  Cardiac:  Heart regular rate and rhythm without murmur.  Respiratory:  Lungs clear to auscultation bilaterally and excellent air movement throughout.  Pulses:                            Right          Left         Bruit  Carotid                2+              2+           No  Radial                 2+              2+  Abd Ao               Not bounding  Femoral             Not palpable bilaterally  DP                      2+              2+  PT                      2+              2+  Venous:  Absent JVD. Absent lower extremity varicosities.  Dermatology:  Non-dystrophic toe nails. No Integument changes of the toes, feet, ankles, shins.  Lower Extremities:  No leg edema.  Toes are warm and pink    Result Review :                    Assessment and Plan   Diagnoses and all orders for this visit:    1. Acute rhinitis (Primary)  -     guaiFENesin (MUCINEX) 600 MG 12 hr tablet; Take 1 tablet by mouth 2 (Two) Times a Day As Needed for Cough.  Dispense: 30 tablet; Refill: 0    2. Benign hypertension  -     amLODIPine (NORVASC) 2.5 MG tablet; Take 1 tablet by mouth Daily.  Dispense: 90 tablet; Refill: 0    3. COVID-19 virus detected  Comments:  MDM: Asymptomatic.  Also, due to CKD, Paxlovid is contraindicated.  Measurements to decrease spread of infection, discussed  Orders:  -     POCT " SARS-CoV-2 Antigen GOKUL + Flu      MDM: Stable. Continue present medications without changes.         Follow Up   Return in about 3 months (around 4/2/2024) for Cor.    Patient was given instructions and counseling regarding her condition or for health maintenance advice. Please see specific information pulled into the AVS if appropriate.     Rama Brooks MD

## 2024-01-16 ENCOUNTER — OFFICE VISIT (OUTPATIENT)
Dept: ENDOCRINOLOGY | Facility: CLINIC | Age: 69
End: 2024-01-16
Payer: MEDICARE

## 2024-01-16 VITALS
BODY MASS INDEX: 26.13 KG/M2 | HEIGHT: 62 IN | DIASTOLIC BLOOD PRESSURE: 75 MMHG | HEART RATE: 77 BPM | SYSTOLIC BLOOD PRESSURE: 130 MMHG | OXYGEN SATURATION: 95 % | WEIGHT: 142 LBS

## 2024-01-16 DIAGNOSIS — I10 BENIGN HYPERTENSION: ICD-10-CM

## 2024-01-16 DIAGNOSIS — N18.5 CKD (CHRONIC KIDNEY DISEASE) STAGE 5, GFR LESS THAN 15 ML/MIN: ICD-10-CM

## 2024-01-16 DIAGNOSIS — E11.42 DIABETIC PERIPHERAL NEUROPATHY: ICD-10-CM

## 2024-01-16 DIAGNOSIS — E03.9 ACQUIRED HYPOTHYROIDISM: ICD-10-CM

## 2024-01-16 DIAGNOSIS — E10.65 TYPE 1 DIABETES MELLITUS WITH HYPERGLYCEMIA: Primary | ICD-10-CM

## 2024-01-16 DIAGNOSIS — E78.2 MIXED HYPERLIPIDEMIA: ICD-10-CM

## 2024-01-16 PROCEDURE — 95251 CONT GLUC MNTR ANALYSIS I&R: CPT | Performed by: INTERNAL MEDICINE

## 2024-01-16 PROCEDURE — 1159F MED LIST DOCD IN RCRD: CPT | Performed by: INTERNAL MEDICINE

## 2024-01-16 PROCEDURE — 99214 OFFICE O/P EST MOD 30 MIN: CPT | Performed by: INTERNAL MEDICINE

## 2024-01-16 PROCEDURE — 1160F RVW MEDS BY RX/DR IN RCRD: CPT | Performed by: INTERNAL MEDICINE

## 2024-01-16 PROCEDURE — 3075F SYST BP GE 130 - 139MM HG: CPT | Performed by: INTERNAL MEDICINE

## 2024-01-16 PROCEDURE — 3078F DIAST BP <80 MM HG: CPT | Performed by: INTERNAL MEDICINE

## 2024-01-16 NOTE — PROGRESS NOTES
Endocrine Progress Note Outpatient     Patient Care Team:  Rama Brooks MD as PCP - General (Family Medicine)    Chief Complaint: Follow-up type 1 diabetes    HPI: 68-year-old female with previous diagnosis of type 1 diabetes, hypertension, hyperlipidemia and hypothyroidism is here for follow-up.    For type 1 diabetes: She is currently on the T-slim insulin pump with Dexcom monitoring system.  Current basal rates are as follows midnight 0.47 units/h, at 7 AM is 0.52 units/h.  Insulin carb ratio at midnight is 1 unit for each 15 g of carbohydrate, at 7 AM is 1 unit for each 18 g of carbohydrate.  Insulin correction factor is 1 unit for each 85 mg BS with a target blood sugar of 110.      Since last seen she has not been hospitalized with low or high blood sugars.    Hypertension: Well controlled.     Hyperlipidemia: Currently on Crestor and Zetia.    Hypothyroidism: On levothyroxine supplementation    End-stage renal disease: On hemodialysis now since 10/2022.     Past Medical History:   Diagnosis Date    Allergies     YEAR ROUND    Arthritis     Benign hypertension 2005    Bleeding of eye 2015    Retinal bleed - Laser Treatment    CKD (chronic kidney disease) stage 3, GFR 30-59 ml/min 2014    Diabetes 1995    Diabetic neuropathy 2001    GERD (gastroesophageal reflux disease)     History of tobacco use     2.5 PPD; 3150-2877    Hyperlipidemia 1995    Hypothyroidism     Inflammatory polyarthritis     Lupus 2005    Neuropathy     Primary osteoarthritis     Retinal hemorrhage 2015    [facility] - LASER TREATMENT    Rheumatoid arthritis 2005    Wrist fracture     Zoster without complications 05/23/2022       Social History     Socioeconomic History    Marital status: Single    Number of children: 0    Years of education: 14   Tobacco Use    Smoking status: Former     Packs/day: 2.50     Years: 24.00     Additional pack years: 0.00     Total pack years: 60.00     Types: Cigarettes     Start date: 1971     Quit date: 1995      Years since quittin.0     Passive exposure: Past    Smokeless tobacco: Never   Vaping Use    Vaping Use: Never used   Substance and Sexual Activity    Alcohol use: Not Currently    Drug use: Defer    Sexual activity: Defer       Family History   Problem Relation Age of Onset    Heart disease Mother     Hypertension Father     Heart disease Father     Diabetes Maternal Grandmother        Allergies   Allergen Reactions    Azithromycin GI Intolerance and Other (See Comments)     nausea  nausea      Adhesive Tape Rash    Lisinopril Cough     Other reaction(s): Cough    Losartan Cough     Other reaction(s): Cough       ROS:   Constitutional:  Denies fatigue, tiredness.    Eyes:  Denies change in visual acuity   HENT:  Denies nasal congestion or sore throat   Respiratory: denies cough, shortness of breath.   Cardiovascular:  denies chest pain, edema   GI:  Denies abdominal pain, nausea, vomiting.   Musculoskeletal:  Denies back pain or joint pain   Integument:  Denies dry skin and rash   Neurologic:  Denies headache, focal weakness or sensory changes   Endocrine:  Denies polyuria or polydipsia   Psychiatric:  Denies depression or anxiety      Vitals:    24 1410   BP: 130/75   Pulse: 77   SpO2: 95%       Body mass index is 25.97 kg/m².     Physical Exam:  GEN: NAD, conversant  EYES: EOMI,   NECK: no thyromegaly,  CV: RRR,   LUNG: CTA  PSYCH: AOX3, appropriate mood, affect normal  FEET: Has bilateral bunions    Results Review:     I reviewed the patient's new clinical results.    Dexcom download interpretation: Dates covered was between 2023 to 2023.  Average blood sugar is 165.  Spending 92% in the range and 7% above range blood glucose graph did not show significant fluctuations.      HEMOGLOBIN A1C (01/10/2024 14:49)      Medication Review: Reviewed.       Current Outpatient Medications:     acetaminophen (TYLENOL) 650 MG 8 hr tablet, Take 1 tablet by mouth Daily As Needed for  Mild Pain, Moderate Pain or Headache., Disp: , Rfl:     amLODIPine (NORVASC) 2.5 MG tablet, Take 1 tablet by mouth Daily., Disp: 90 tablet, Rfl: 0    Apple Cider Vinegar 500 MG tablet, Take 1 tablet by mouth Daily., Disp: , Rfl:     aspirin 81 MG EC tablet, Take 1 tablet by mouth Daily., Disp: , Rfl:     Baqsimi One Pack 3 MG/DOSE powder, USE ONE SPRAY IN THE NOSTRILS AS DIRECTED BY THE PROVIDER IN THE CASE OF LOW BLOOD SUGARS, Disp: 1 each, Rfl: 3    BD Pen Needle Lupe 2nd Gen 32G X 4 MM misc, Pt to use with insulin pens 5 times daily, Disp: 150 each, Rfl: 1    Blood Glucose Monitoring Suppl (ONE TOUCH ULTRA 2) w/Device kit, ONETOUCH ULTRA 2 w/Device KIT, Disp: , Rfl:     bumetanide (BUMEX) 2 MG tablet, See administration instructions. 4 mg in the AM and 2 mg in the PM, Disp: , Rfl:     calcitriol (ROCALTROL) 0.25 MCG capsule, TAKE ONE CAPSULE BY MOUTH DAILY (Patient taking differently: 3 (Three) Times a Week.), Disp: 90 capsule, Rfl: 0    cetirizine (zyrTEC) 10 MG tablet, Take 1 tablet by mouth Daily., Disp: , Rfl:     Cholecalciferol 50 MCG (2000 UT) capsule, Take 1 tablet by mouth Daily., Disp: , Rfl:     Continuous Blood Gluc  (Dexcom G6 ) device, 1 Device Daily., Disp: 1 each, Rfl: 1    Continuous Blood Gluc Sensor (Dexcom G6 Sensor), Every 10 (Ten) Days., Disp: 2 each, Rfl: 6    Continuous Blood Gluc Transmit (Dexcom G6 Transmitter) misc, 1 Device Daily., Disp: 1 each, Rfl: 0    Cyanocobalamin (Vitamin B-12) 1000 MCG sublingual tablet, Place 1 tablet under the tongue Daily., Disp: 100 tablet, Rfl: 3    DULoxetine (CYMBALTA) 30 MG capsule, Take 1 capsule by mouth Daily., Disp: , Rfl:     ELDERBERRY PO, Take 1 each by mouth Daily., Disp: , Rfl:     esomeprazole (nexIUM) 40 MG capsule, Take 1 capsule by mouth Daily., Disp: , Rfl:     gabapentin (NEURONTIN) 100 MG capsule, Take 1 capsule by mouth Daily., Disp: , Rfl:     glucose blood test strip, ONETOUCH ULTRA BLUE STRP, Disp: , Rfl:      guaiFENesin (MUCINEX) 600 MG 12 hr tablet, Take 1 tablet by mouth 2 (Two) Times a Day As Needed for Cough., Disp: 30 tablet, Rfl: 0    HumaLOG 100 UNIT/ML injection, INJECT UNDER THE SKIN AS DIRECTED VIA INSULIN PUMP, DO NOT EXCEED 40 UNITS DAILY, Disp: 20 mL, Rfl: 6    hydroxychloroquine (PLAQUENIL) 200 MG tablet, Take 1 tablet by mouth 2 (Two) Times a Day., Disp: , Rfl:     hydrOXYzine (ATARAX) 10 MG tablet, Take 1 tablet by mouth., Disp: , Rfl:     Lancets (onetouch ultrasoft) lancets, ONETOUCH ULTRASOFT LANCETS, Disp: , Rfl:     levothyroxine (SYNTHROID, LEVOTHROID) 150 MCG tablet, , Disp: , Rfl:     lidocaine (XYLOCAINE) 5 % ointment, , Disp: , Rfl:     Multiple Vitamins-Minerals (ICAPS AREDS 2 PO), Take 1 each by mouth 2 (Two) Times a Day., Disp: , Rfl:     polyethylene glycol (MIRALAX) 17 g packet, Take 17 g by mouth Daily As Needed., Disp: , Rfl:     prenatal vitamin (prenatal, CLASSIC, vitamin) tablet, Take 2 tablets by mouth Daily., Disp: , Rfl:     rosuvastatin (CRESTOR) 10 MG tablet, TAKE 1 TABLET BY MOUTH ONCE NIGHTLY, Disp: 90 tablet, Rfl: 3      Assessment & Plan   1.  Diabetes mellitus type 1 with Hyperglycemia: Well-controlled with A1c at 6.4%.  No significant issues with low blood sugars.  Will continue current insulin pump settings.  Advised to always keep glucose source in case of low blood sugars.    2.  Hypertension: Well-controlled, continue current management.    3.  Hyperlipidemia: On Crestor and Zetia.    4.  Hypothyroidism: On levothyroxine, no recent labs, will follow TSH and free T4.    5.  End-stage renal disease: On hemodialysis.    6.  Diabetic peripheral neuropathy: She has bilateral bunions, she will benefit from diabetic shoes.    Assessment and plan from October 17, 2023 reviewed and updated.                Jo Mahoney MD FACE.    6/8/2021: Addendum: Patient is checking her blood sugars 4 times daily.

## 2024-01-16 NOTE — PATIENT INSTRUCTIONS
Continue current medication  Always keep glucose source in case of low blood sugar  Labs before follow-up.

## 2024-01-22 RX ORDER — INSULIN LISPRO 100 [IU]/ML
INJECTION, SOLUTION INTRAVENOUS; SUBCUTANEOUS
Qty: 20 ML | Refills: 6 | Status: SHIPPED | OUTPATIENT
Start: 2024-01-22

## 2024-03-19 ENCOUNTER — OFFICE VISIT (OUTPATIENT)
Dept: FAMILY MEDICINE CLINIC | Facility: CLINIC | Age: 69
End: 2024-03-19
Payer: MEDICARE

## 2024-03-19 VITALS
DIASTOLIC BLOOD PRESSURE: 76 MMHG | BODY MASS INDEX: 25.98 KG/M2 | RESPIRATION RATE: 16 BRPM | OXYGEN SATURATION: 96 % | SYSTOLIC BLOOD PRESSURE: 148 MMHG | HEIGHT: 62 IN | TEMPERATURE: 98.4 F | HEART RATE: 75 BPM | WEIGHT: 141.2 LBS

## 2024-03-19 DIAGNOSIS — E78.2 MIXED HYPERLIPIDEMIA: ICD-10-CM

## 2024-03-19 DIAGNOSIS — E10.21 TYPE 1 DIABETES MELLITUS WITH DIABETIC NEPHROPATHY: ICD-10-CM

## 2024-03-19 DIAGNOSIS — I10 BENIGN HYPERTENSION: Primary | ICD-10-CM

## 2024-03-19 DIAGNOSIS — Z12.31 ENCOUNTER FOR SCREENING MAMMOGRAM FOR MALIGNANT NEOPLASM OF BREAST: ICD-10-CM

## 2024-03-19 PROCEDURE — 99214 OFFICE O/P EST MOD 30 MIN: CPT | Performed by: FAMILY MEDICINE

## 2024-03-19 PROCEDURE — 3078F DIAST BP <80 MM HG: CPT | Performed by: FAMILY MEDICINE

## 2024-03-19 PROCEDURE — 3077F SYST BP >= 140 MM HG: CPT | Performed by: FAMILY MEDICINE

## 2024-03-19 RX ORDER — AMLODIPINE BESYLATE 2.5 MG/1
2.5 TABLET ORAL DAILY
Qty: 90 TABLET | Refills: 0 | Status: CANCELLED | OUTPATIENT
Start: 2024-03-19

## 2024-03-19 RX ORDER — AMLODIPINE BESYLATE 2.5 MG/1
2.5 TABLET ORAL 2 TIMES DAILY
COMMUNITY
Start: 2024-03-08

## 2024-03-19 RX ORDER — FLUTICASONE PROPIONATE 50 MCG
SPRAY, SUSPENSION (ML) NASAL
COMMUNITY
Start: 2024-03-17

## 2024-03-19 RX ORDER — LIDOCAINE AND PRILOCAINE 25; 25 MG/G; MG/G
CREAM TOPICAL
COMMUNITY
Start: 2024-01-21

## 2024-03-19 NOTE — PROGRESS NOTES
Na Handy presents to [facility]      Chief Complaint  HTN    HPI (Tripped on a wire on her home side walk 2/6/24.  No LOC.  Evaluated in the ER for contusions to the face with Left brow hematoma.  Doing well now.  Amlodipine changed to 2.5 mg twice a day by renal dialysis. Skips morning dose on dialysis days to prevent hypotension.  On renal transplant list)    Respiratory:  SOA:  no. Exertional dyspnea: no.  Dyspnea at rest: no.  Flights of stairs climbable: 3.  Cardiology:  Chest pain: no.  Dizziness: no. Easily fatigued: no.  Pedal edema: no.  Light-headiness: no.  Symptoms of claudication: no.  Orthopnea: no.  Palpitations: no. Tachycardia: no.  Ophthalmology:  Last eye exam date: 06/2023 . Vision changes: none.  Gastroenterology:  Heartburn: no.  Nausea: no.        Current Outpatient Medications:     acetaminophen (TYLENOL) 650 MG 8 hr tablet, Take 1 tablet by mouth Daily As Needed for Mild Pain, Moderate Pain or Headache., Disp: , Rfl:     amLODIPine (NORVASC) 2.5 MG tablet, Take 1 tablet by mouth 2 (Two) Times a Day., Disp: , Rfl:     Apple Cider Vinegar 500 MG tablet, Take 1 tablet by mouth Daily., Disp: , Rfl:     aspirin 81 MG EC tablet, Take 1 tablet by mouth Daily., Disp: , Rfl:     Baqsimi One Pack 3 MG/DOSE powder, USE ONE SPRAY IN THE NOSTRILS AS DIRECTED BY THE PROVIDER IN THE CASE OF LOW BLOOD SUGARS, Disp: 1 each, Rfl: 3    BD Pen Needle Lupe 2nd Gen 32G X 4 MM misc, Pt to use with insulin pens 5 times daily, Disp: 150 each, Rfl: 1    Blood Glucose Monitoring Suppl (ONE TOUCH ULTRA 2) w/Device kit, ONETOUCH ULTRA 2 w/Device KIT, Disp: , Rfl:     bumetanide (BUMEX) 2 MG tablet, 2 mg in the AM and 2 mg in PM, Disp: , Rfl:     calcitriol (ROCALTROL) 0.25 MCG capsule, TAKE ONE CAPSULE BY MOUTH DAILY (Patient taking differently: 3 (Three) Times a Week.), Disp: 90 capsule, Rfl: 0    cetirizine (zyrTEC) 10 MG tablet, Take 1 tablet by mouth Daily., Disp: , Rfl:      Cholecalciferol 50 MCG (2000 UT) capsule, Take 1 tablet by mouth Daily., Disp: , Rfl:     Continuous Blood Gluc  (Dexcom G6 ) device, 1 Device Daily., Disp: 1 each, Rfl: 1    Continuous Blood Gluc Sensor (Dexcom G6 Sensor), Every 10 (Ten) Days., Disp: 2 each, Rfl: 6    Continuous Blood Gluc Transmit (Dexcom G6 Transmitter) misc, 1 Device Daily., Disp: 1 each, Rfl: 0    Cyanocobalamin (Vitamin B-12) 1000 MCG sublingual tablet, Place 1 tablet under the tongue Daily., Disp: 100 tablet, Rfl: 3    DULoxetine (CYMBALTA) 30 MG capsule, Take 1 capsule by mouth Daily., Disp: , Rfl:     ELDERBERRY PO, Take 1 each by mouth Daily., Disp: , Rfl:     esomeprazole (nexIUM) 40 MG capsule, Take 1 capsule by mouth Daily., Disp: , Rfl:     fluticasone (FLONASE) 50 MCG/ACT nasal spray, , Disp: , Rfl:     gabapentin (NEURONTIN) 100 MG capsule, Take 1 capsule by mouth Daily., Disp: , Rfl:     glucose blood test strip, ONETOUCH ULTRA BLUE STRP, Disp: , Rfl:     guaiFENesin (MUCINEX) 600 MG 12 hr tablet, Take 1 tablet by mouth 2 (Two) Times a Day As Needed for Cough., Disp: 30 tablet, Rfl: 0    hydroxychloroquine (PLAQUENIL) 200 MG tablet, Take 1 tablet by mouth 2 (Two) Times a Day., Disp: , Rfl:     Lancets (onetouch ultrasoft) lancets, ONETOUCH ULTRASOFT LANCETS, Disp: , Rfl:     levothyroxine (SYNTHROID, LEVOTHROID) 150 MCG tablet, , Disp: , Rfl:     lidocaine (XYLOCAINE) 5 % ointment, , Disp: , Rfl:     lidocaine-prilocaine (EMLA) 2.5-2.5 % cream, APPLY SMALL AMOUNT TOPICALLY TO SKIN AS DIRECTED 1 HOURS BEFORE APPT, Disp: , Rfl:     Multiple Vitamins-Minerals (ICAPS AREDS 2 PO), Take 1 each by mouth 2 (Two) Times a Day., Disp: , Rfl:     polyethylene glycol (MIRALAX) 17 g packet, Take 17 g by mouth Daily As Needed., Disp: , Rfl:     prenatal vitamin (prenatal, CLASSIC, vitamin) tablet, Take 2 tablets by mouth Daily., Disp: , Rfl:     rosuvastatin (CRESTOR) 10 MG tablet, TAKE 1 TABLET BY MOUTH ONCE NIGHTLY, Disp: 90  tablet, Rfl: 3    HumaLOG 100 UNIT/ML injection, INJECT SUBCUTANEOUS VIA INSULIN PUMP MAXIMUM DAILY DOSE IS 40 UNITS, Disp: 20 mL, Rfl: 6    hydrOXYzine (ATARAX) 10 MG tablet, Take 1 tablet by mouth., Disp: , Rfl:     mupirocin (BACTROBAN) 2 % ointment, Apply  topically to the appropriate area as directed See Admin Instructions. Apply topically to the affected area twice daily, Disp: , Rfl:      Allergies   Allergen Reactions    Azithromycin GI Intolerance and Other (See Comments)     nausea  nausea      Adhesive Tape Rash    Lisinopril Cough     Other reaction(s): Cough    Losartan Cough     Other reaction(s): Cough        Past Medical History:   Diagnosis Date    Allergies     YEAR ROUND    Arthritis     Benign hypertension 2005    Bleeding of eye 2015    Retinal bleed - Laser Treatment    CKD (chronic kidney disease) stage 3, GFR 30-59 ml/min 2014    Diabetes 1995    Diabetic neuropathy 2001    GERD (gastroesophageal reflux disease)     History of tobacco use     2.5 PPD; 1570-1301    Hyperlipidemia 1995    Hypothyroidism     Inflammatory polyarthritis     Lupus 2005    Neuropathy     Primary osteoarthritis     Retinal hemorrhage 2015    [facility] - LASER TREATMENT    Rheumatoid arthritis 2005    Wrist fracture     Zoster without complications 05/23/2022       Past Surgical History:   Procedure Laterality Date    ABDOMINAL HYSTERECTOMY  1993    ARTERIOVENOUS FISTULA REPAIR  06/23/2023    CARPAL TUNNEL RELEASE Right 05/2022    CATARACT EXTRACTION  2015    COLONOSCOPY  04/2021    4 POLYPS    COLONOSCOPY W/ BIOPSIES  2008    POLYPS    ENDOSCOPY  2008    GERD    ENDOSCOPY  06/2020    ESOPHAGEAL HYPERMOTILITY    ENDOSCOPY  04/2021    RETINAL LASER PROCEDURE  2015    [facility] - Retinal Bleed    SINUS SURGERY  1980    TONSILLECTOMY AND ADENOIDECTOMY  1984        Family History   Problem Relation Age of Onset    Heart disease Mother     Hypertension Father     Heart disease Father     Diabetes Maternal Grandmother         Social  "History     Socioeconomic History    Marital status: Single    Number of children: 0    Years of education: 14   Tobacco Use    Smoking status: Former     Current packs/day: 0.00     Average packs/day: 2.5 packs/day for 24.0 years (60.0 ttl pk-yrs)     Types: Cigarettes     Start date:      Quit date:      Years since quittin.2     Passive exposure: Past    Smokeless tobacco: Never   Vaping Use    Vaping status: Never Used   Substance and Sexual Activity    Alcohol use: Not Currently    Drug use: Defer    Sexual activity: Defer            Objective   Vital Signs:  /76 (BP Location: Right arm, Patient Position: Sitting, Cuff Size: Small Adult)   Pulse 75   Temp 98.4 °F (36.9 °C) (Infrared)   Resp 16   Ht 157.5 cm (62\")   Wt 64 kg (141 lb 3.2 oz)   SpO2 96%   BMI 25.83 kg/m²   Estimated body mass index is 25.83 kg/m² as calculated from the following:    Height as of this encounter: 157.5 cm (62\").    Weight as of this encounter: 64 kg (141 lb 3.2 oz).        Physical Exam  General:  No acute distress, cheerful, well kept, interactive, good energy level.  Cardiac:  Heart regular rate and rhythm without murmur.  Respiratory:  Lungs clear to auscultation bilaterally and excellent air movement throughout.  Pulses:                            Right          Left         Bruit  Carotid                2+              2+           No  Radial                 2+              2+  Abd Ao               Not bounding  Femoral             Not palpable bilaterally  DP                      2+              2+  PT                      2+              2+  Venous:  Absent JVD. Absent lower extremity varicosities.  Dermatology:  Non-dystrophic toe nails. No Integument changes of the toes, feet, ankles, shins. Spotty ecchymosis without edema from maxilla to brows  Lower Extremities:  No leg edema.  Toes are warm and pink    Result Review :                    Assessment and Plan   Diagnoses and all orders for this " visit:    1. Benign hypertension (Primary)    2. Mixed hyperlipidemia    3. Type 1 diabetes mellitus with diabetic nephropathy    4. Encounter for screening mammogram for malignant neoplasm of breast  -     Mammo Screening Bilateral With CAD; Future    MDM: Stable. Continue present medications without changes.           Follow Up   Return in about 3 months (around 6/19/2024) for Cor.    Patient was given instructions and counseling regarding her condition or for health maintenance advice. Please see specific information pulled into the AVS if appropriate.     Rama Brooks MD

## 2024-04-08 ENCOUNTER — TELEPHONE (OUTPATIENT)
Dept: ENDOCRINOLOGY | Facility: CLINIC | Age: 69
End: 2024-04-08

## 2024-04-09 ENCOUNTER — TELEPHONE (OUTPATIENT)
Dept: ENDOCRINOLOGY | Facility: CLINIC | Age: 69
End: 2024-04-09

## 2024-04-11 NOTE — PROGRESS NOTES
Request for mammogram results to be faxed to office. Postpone x  3 days while awaiting for results to be received.    Patient Specific Counseling (Will Not Stick From Patient To Patient): No active eruption today. Pictures to be taken when active. Prompt follow when active. Detail Level: Detailed

## 2024-04-15 ENCOUNTER — OFFICE VISIT (OUTPATIENT)
Dept: ENDOCRINOLOGY | Facility: CLINIC | Age: 69
End: 2024-04-15
Payer: MEDICARE

## 2024-04-15 VITALS
WEIGHT: 144 LBS | DIASTOLIC BLOOD PRESSURE: 75 MMHG | BODY MASS INDEX: 26.5 KG/M2 | HEIGHT: 62 IN | SYSTOLIC BLOOD PRESSURE: 135 MMHG | HEART RATE: 68 BPM | OXYGEN SATURATION: 95 %

## 2024-04-15 DIAGNOSIS — E78.2 MIXED HYPERLIPIDEMIA: ICD-10-CM

## 2024-04-15 DIAGNOSIS — I10 BENIGN HYPERTENSION: ICD-10-CM

## 2024-04-15 DIAGNOSIS — E10.65 TYPE 1 DIABETES MELLITUS WITH HYPERGLYCEMIA: Primary | ICD-10-CM

## 2024-04-15 DIAGNOSIS — E11.42 DIABETIC PERIPHERAL NEUROPATHY: ICD-10-CM

## 2024-04-15 DIAGNOSIS — N18.5 CKD (CHRONIC KIDNEY DISEASE) STAGE 5, GFR LESS THAN 15 ML/MIN: ICD-10-CM

## 2024-04-15 DIAGNOSIS — E03.9 ACQUIRED HYPOTHYROIDISM: ICD-10-CM

## 2024-04-15 PROCEDURE — 3075F SYST BP GE 130 - 139MM HG: CPT | Performed by: INTERNAL MEDICINE

## 2024-04-15 PROCEDURE — 95251 CONT GLUC MNTR ANALYSIS I&R: CPT | Performed by: INTERNAL MEDICINE

## 2024-04-15 PROCEDURE — 1160F RVW MEDS BY RX/DR IN RCRD: CPT | Performed by: INTERNAL MEDICINE

## 2024-04-15 PROCEDURE — 99214 OFFICE O/P EST MOD 30 MIN: CPT | Performed by: INTERNAL MEDICINE

## 2024-04-15 PROCEDURE — 1159F MED LIST DOCD IN RCRD: CPT | Performed by: INTERNAL MEDICINE

## 2024-04-15 PROCEDURE — 3078F DIAST BP <80 MM HG: CPT | Performed by: INTERNAL MEDICINE

## 2024-04-15 RX ORDER — ROSUVASTATIN CALCIUM 20 MG/1
20 TABLET, COATED ORAL NIGHTLY
Qty: 90 TABLET | Refills: 3 | Status: SHIPPED | OUTPATIENT
Start: 2024-04-15

## 2024-04-15 RX ORDER — CHOLECALCIFEROL (VITAMIN D3) 125 MCG
CAPSULE ORAL
COMMUNITY
Start: 2024-03-22

## 2024-04-15 RX ORDER — INSULIN LISPRO 100 [IU]/ML
INJECTION, SOLUTION INTRAVENOUS; SUBCUTANEOUS
Qty: 30 ML | Refills: 6 | Status: SHIPPED | OUTPATIENT
Start: 2024-04-15

## 2024-04-15 NOTE — PATIENT INSTRUCTIONS
Increase Crestor to 20 mg p.o. daily  Watch for low blood sugars as I have adjusted your insulin dosages on the pump.  Always keep glucose source in case of low blood sugar  Labs before follow-up.

## 2024-04-15 NOTE — PROGRESS NOTES
Endocrine Progress Note Outpatient     Patient Care Team:  Rama Brooks MD as PCP - General (Family Medicine)    Chief Complaint: Follow-up type 1 diabetes    HPI: 68-year-old female with previous diagnosis of type 1 diabetes, hypertension, hyperlipidemia and hypothyroidism is here for follow-up.    For type 1 diabetes: She is currently on the T-slim insulin pump with Dexcom monitoring system.  Current basal rates are as follows midnight 0.47 units/h, at 7 AM is 0.52 units/h.  Insulin carb ratio at midnight is 1 unit for each 15 g of carbohydrate, at 7 AM is 1 unit for each 18 g of carbohydrate.  Insulin correction factor is 1 unit for each 85 mg BS with a target blood sugar of 110.  Active insulin is 5 hours.    Since last seen she has not been hospitalized with low or high blood sugars.    Hypertension: Well controlled.     Hyperlipidemia: Currently on Crestor and Zetia.    Hypothyroidism: On levothyroxine supplementation    End-stage renal disease: On hemodialysis now since 10/2022.     Past Medical History:   Diagnosis Date    Allergies     YEAR ROUND    Arthritis     Benign hypertension 2005    Bleeding of eye 2015    Retinal bleed - Laser Treatment    CKD (chronic kidney disease) stage 3, GFR 30-59 ml/min 2014    Diabetes 1995    Diabetic neuropathy 2001    GERD (gastroesophageal reflux disease)     History of tobacco use     2.5 PPD; 6787-4407    Hyperlipidemia 1995    Hypothyroidism     Inflammatory polyarthritis     Lupus 2005    Neuropathy     Primary osteoarthritis     Retinal hemorrhage 2015    [facility] - LASER TREATMENT    Rheumatoid arthritis 2005    Wrist fracture     Zoster without complications 05/23/2022       Social History     Socioeconomic History    Marital status: Single    Number of children: 0    Years of education: 14   Tobacco Use    Smoking status: Former     Current packs/day: 0.00     Average packs/day: 2.5 packs/day for 24.0 years (60.0 ttl pk-yrs)     Types: Cigarettes     Start  date:      Quit date:      Years since quittin.3     Passive exposure: Past    Smokeless tobacco: Never   Vaping Use    Vaping status: Never Used   Substance and Sexual Activity    Alcohol use: Not Currently    Drug use: Defer    Sexual activity: Defer       Family History   Problem Relation Age of Onset    Heart disease Mother     Hypertension Father     Heart disease Father     Diabetes Maternal Grandmother        Allergies   Allergen Reactions    Azithromycin GI Intolerance and Other (See Comments)     nausea  nausea      Adhesive Tape Rash    Lisinopril Cough     Other reaction(s): Cough    Losartan Cough     Other reaction(s): Cough       ROS:   Constitutional:  Denies fatigue, tiredness.    Eyes:  Denies change in visual acuity   HENT:  Denies nasal congestion or sore throat   Respiratory: denies cough, shortness of breath.   Cardiovascular:  denies chest pain, edema   GI:  Denies abdominal pain, nausea, vomiting.   Musculoskeletal:  Denies back pain or joint pain   Integument:  Denies dry skin and rash   Neurologic:  Denies headache, focal weakness or sensory changes   Endocrine:  Denies polyuria or polydipsia   Psychiatric:  Denies depression or anxiety      Vitals:    04/15/24 0952   BP: 135/75   Pulse: 68   SpO2: 95%       Body mass index is 26.34 kg/m².     Physical Exam:  GEN: NAD, conversant  CV: RRR,   LUNG: CTA  PSYCH: AOX3, appropriate mood, affect normal      Results Review:     I reviewed the patient's new clinical results.    Dexcom download interpretation: Dates covered was between 2024 to 2024.  Average blood sugar is 185.  Spending 86% in the range and 13% above range.  Glucose graph does show some hyperglycemia postmeal.      Labs from April 10, 2024 showed serum sodium 134, potassium 4.5, chloride 99, CO2 28, glucose 208, BUN 37 with creatinine 2.99  AST 34, ALT 34  Total cholesterol 208, triglycerides 113, HDL 83 and LDL is 102  TSH is 3.46 with free  T41.49  A1c was 6.1  Urine microalbumin was more than 1140.        HEMOGLOBIN A1C (01/10/2024 14:49)      Medication Review: Reviewed.       Current Outpatient Medications:     acetaminophen (TYLENOL) 650 MG 8 hr tablet, Take 1 tablet by mouth Daily As Needed for Mild Pain, Moderate Pain or Headache., Disp: , Rfl:     amLODIPine (NORVASC) 2.5 MG tablet, Take 1 tablet by mouth 2 (Two) Times a Day., Disp: , Rfl:     Apple Cider Vinegar 500 MG tablet, Take 1 tablet by mouth Daily., Disp: , Rfl:     aspirin 81 MG EC tablet, Take 1 tablet by mouth Daily., Disp: , Rfl:     B-12, Methylcobalamin, 1000 MCG sublingual tablet, PLACE 1 TABLET UNDER THE TONGUE AND ALLOW TO DISSOLVE DAILY, Disp: , Rfl:     Baqsimi One Pack 3 MG/DOSE powder, USE ONE SPRAY IN THE NOSTRILS AS DIRECTED BY THE PROVIDER IN THE CASE OF LOW BLOOD SUGARS, Disp: 1 each, Rfl: 3    BD Pen Needle Lupe 2nd Gen 32G X 4 MM misc, Pt to use with insulin pens 5 times daily, Disp: 150 each, Rfl: 1    Blood Glucose Monitoring Suppl (ONE TOUCH ULTRA 2) w/Device kit, ONETOUCH ULTRA 2 w/Device KIT, Disp: , Rfl:     bumetanide (BUMEX) 2 MG tablet, 2 mg in the AM and 2 mg in PM, Disp: , Rfl:     calcitriol (ROCALTROL) 0.25 MCG capsule, TAKE ONE CAPSULE BY MOUTH DAILY (Patient taking differently: 3 (Three) Times a Week.), Disp: 90 capsule, Rfl: 0    cetirizine (zyrTEC) 10 MG tablet, Take 1 tablet by mouth Daily., Disp: , Rfl:     Cholecalciferol 50 MCG (2000 UT) capsule, Take 1 tablet by mouth Daily., Disp: , Rfl:     Continuous Blood Gluc  (Dexcom G6 ) device, 1 Device Daily., Disp: 1 each, Rfl: 1    Continuous Blood Gluc Sensor (Dexcom G6 Sensor), Every 10 (Ten) Days., Disp: 2 each, Rfl: 6    Continuous Blood Gluc Transmit (Dexcom G6 Transmitter) misc, 1 Device Daily., Disp: 1 each, Rfl: 0    Cyanocobalamin (Vitamin B-12) 1000 MCG sublingual tablet, Place 1 tablet under the tongue Daily., Disp: 100 tablet, Rfl: 3    DULoxetine (CYMBALTA) 30 MG capsule,  Take 1 capsule by mouth Daily., Disp: , Rfl:     ELDERBERRY PO, Take 1 each by mouth Daily., Disp: , Rfl:     esomeprazole (nexIUM) 40 MG capsule, Take 1 capsule by mouth Daily., Disp: , Rfl:     fluticasone (FLONASE) 50 MCG/ACT nasal spray, , Disp: , Rfl:     gabapentin (NEURONTIN) 100 MG capsule, Take 1 capsule by mouth Daily., Disp: , Rfl:     glucose blood test strip, ONETOUCH ULTRA BLUE STRP, Disp: , Rfl:     guaiFENesin (MUCINEX) 600 MG 12 hr tablet, Take 1 tablet by mouth 2 (Two) Times a Day As Needed for Cough., Disp: 30 tablet, Rfl: 0    HumaLOG 100 UNIT/ML injection, INJECT SUBCUTANEOUS VIA INSULIN PUMP MAXIMUM DAILY DOSE IS 40 UNITS, Disp: 20 mL, Rfl: 6    hydroxychloroquine (PLAQUENIL) 200 MG tablet, Take 1 tablet by mouth 2 (Two) Times a Day., Disp: , Rfl:     hydrOXYzine (ATARAX) 10 MG tablet, Take 1 tablet by mouth., Disp: , Rfl:     Lancets (onetouch ultrasoft) lancets, ONETOUCH ULTRASOFT LANCETS, Disp: , Rfl:     levothyroxine (SYNTHROID, LEVOTHROID) 150 MCG tablet, , Disp: , Rfl:     lidocaine (XYLOCAINE) 5 % ointment, , Disp: , Rfl:     lidocaine-prilocaine (EMLA) 2.5-2.5 % cream, APPLY SMALL AMOUNT TOPICALLY TO SKIN AS DIRECTED 1 HOURS BEFORE APPT, Disp: , Rfl:     Multiple Vitamins-Minerals (ICAPS AREDS 2 PO), Take 1 each by mouth 2 (Two) Times a Day., Disp: , Rfl:     mupirocin (BACTROBAN) 2 % ointment, Apply  topically to the appropriate area as directed See Admin Instructions. Apply topically to the affected area twice daily, Disp: , Rfl:     polyethylene glycol (MIRALAX) 17 g packet, Take 17 g by mouth Daily As Needed., Disp: , Rfl:     prenatal vitamin (prenatal, CLASSIC, vitamin) tablet, Take 2 tablets by mouth Daily., Disp: , Rfl:     rosuvastatin (CRESTOR) 10 MG tablet, TAKE 1 TABLET BY MOUTH ONCE NIGHTLY, Disp: 90 tablet, Rfl: 3      Assessment & Plan   1.  Diabetes mellitus type 1 with Hyperglycemia: Overall doing well with A1c of 6.1%.  She does have some postlunch hyperglycemia, she  is advised to make sure she takes her insulin before meals and I also change insulin carb ratio at 7 PM to midnight at 1 unit for each 15 g of carbohydrate.  She is advised to watch for low blood sugars and always keep glucose source in case of low blood sugars.      2.  Hypertension: Well-controlled, continue current management.    3.  Hyperlipidemia: Uncontrolled with LDL on the high side, she tells me that she recently had cardiac catheterization which did show that she has some plaque buildup, I will increase Crestor to 20 mg p.o. daily and follow lipid panel.    4.  Hypothyroidism: Well-controlled, TSH and free T4 normal.  We will continue current dose of levothyroxine.    5.  End-stage renal disease: On hemodialysis.    6.  Diabetic peripheral neuropathy: She has bilateral bunions, she will benefit from diabetic shoes.    Assessment and plan from January 16, 2024 reviewed and updated.           Jo Mahoney MD FACE.

## 2024-04-22 ENCOUNTER — TELEPHONE (OUTPATIENT)
Dept: ENDOCRINOLOGY | Facility: CLINIC | Age: 69
End: 2024-04-22
Payer: MEDICARE

## 2024-04-25 RX ORDER — LEVOTHYROXINE SODIUM 0.15 MG/1
150 TABLET ORAL DAILY
Qty: 90 TABLET | Refills: 1 | Status: SHIPPED | OUTPATIENT
Start: 2024-04-25

## 2024-06-20 ENCOUNTER — TELEPHONE (OUTPATIENT)
Dept: FAMILY MEDICINE CLINIC | Facility: CLINIC | Age: 69
End: 2024-06-20
Payer: MEDICARE

## 2024-06-20 ENCOUNTER — TELEPHONE (OUTPATIENT)
Dept: ENDOCRINOLOGY | Facility: CLINIC | Age: 69
End: 2024-06-20

## 2024-06-20 NOTE — TELEPHONE ENCOUNTER
Caller: Na Handy    Relationship to patient: Self    Best call back number: [phone]     Patient is needing: PT REQUESTS LABS FOR UPCOMING VISIT BE FAXED TO [facility] IN. PLEASE CALL TO CONFIRM WHEN LABS SENT. OKAY TO LEAVE VM.

## 2024-06-20 NOTE — TELEPHONE ENCOUNTER
HUB to relay description below.      LVM FOR PT TO CALL OFFICE PLEASE RESCHEDULE APPOINTMENT THAT'S ON FOR JULY 2ND  YANI IS OUT THAT WEEK. SCHEDULE NEXT AVAILABLE THANK YOU

## 2024-09-26 ENCOUNTER — OFFICE VISIT (OUTPATIENT)
Dept: ENDOCRINOLOGY | Facility: CLINIC | Age: 69
End: 2024-09-26
Payer: MEDICARE

## 2024-09-26 VITALS
OXYGEN SATURATION: 97 % | HEIGHT: 62 IN | SYSTOLIC BLOOD PRESSURE: 158 MMHG | BODY MASS INDEX: 26.13 KG/M2 | WEIGHT: 142 LBS | DIASTOLIC BLOOD PRESSURE: 70 MMHG | HEART RATE: 77 BPM

## 2024-09-26 DIAGNOSIS — N18.5 CKD (CHRONIC KIDNEY DISEASE) STAGE 5, GFR LESS THAN 15 ML/MIN: ICD-10-CM

## 2024-09-26 DIAGNOSIS — E10.65 TYPE 1 DIABETES MELLITUS WITH HYPERGLYCEMIA: Primary | ICD-10-CM

## 2024-09-26 DIAGNOSIS — E78.2 MIXED HYPERLIPIDEMIA: ICD-10-CM

## 2024-09-26 DIAGNOSIS — I10 BENIGN HYPERTENSION: ICD-10-CM

## 2024-09-26 LAB — GLUCOSE BLDC GLUCOMTR-MCNC: 162 MG/DL (ref 70–105)

## 2024-09-26 PROCEDURE — 82948 REAGENT STRIP/BLOOD GLUCOSE: CPT | Performed by: INTERNAL MEDICINE

## 2024-09-26 RX ORDER — SORBITOL SOLUTION 70 %
SOLUTION, ORAL MISCELLANEOUS
COMMUNITY
Start: 2024-05-31

## 2024-10-24 ENCOUNTER — TELEPHONE (OUTPATIENT)
Dept: ENDOCRINOLOGY | Facility: CLINIC | Age: 69
End: 2024-10-24
Payer: MEDICARE

## 2024-10-24 NOTE — TELEPHONE ENCOUNTER
Please advise insulin pump adjustment after kidney transplant. Pt states BG has been over 200 however had low at night approx 2 weeks ago d/t giving too much insulin.     Pt aware of how to treat low and always keeps glucose source close.      Tandem report scanned and attached.

## 2024-10-25 ENCOUNTER — TELEPHONE (OUTPATIENT)
Dept: ENDOCRINOLOGY | Facility: CLINIC | Age: 69
End: 2024-10-25
Payer: MEDICARE

## 2024-10-25 NOTE — TELEPHONE ENCOUNTER
Called pt. Pt states received alert BG was falling last night. Pt feels she might have counted too many chos at supper. Reviewed cho counting.     Pt states BG still elevated (above 200) however pt scared of dropping during the night. Enc pt to bolus 10-15 minutes before eating to help with elevated BG and to add protein with HS snack to help keep BG from dropping during the night.     Pt had questions regarding switching from G6 to G7.     Per Tandem rep, pt would need to login to account to update software on home computer.     Informed pt. Pt forgot username/password. Provided pt with customer service to [phone], option 1 to help guide through software update.

## 2024-10-31 RX ORDER — LEVOTHYROXINE SODIUM 150 UG/1
150 TABLET ORAL DAILY
Qty: 90 TABLET | Refills: 1 | Status: SHIPPED | OUTPATIENT
Start: 2024-10-31

## 2024-11-07 ENCOUNTER — TELEPHONE (OUTPATIENT)
Dept: ENDOCRINOLOGY | Facility: CLINIC | Age: 69
End: 2024-11-07

## 2024-11-07 NOTE — TELEPHONE ENCOUNTER
"Spoke with pt. Pt states started using G7 with pump.     Pt never returned  call to relay prev recommendations. Pt states she is using the \"basal\" setting on her pump which is the following:     Basal rate 12 am 0.3 units /hr                     7 am 0.52 units /hr  Correction factor 1:85  ICR 1:15  Target .     Discussed Dr. Mahoney's recommendation based on pump settings \"new me\". D/t the difference in insulin amounts, pt felt comfortable only make a change in basal rate and checking back in with office. Pt stated she could not change target BG to 120 but had to keep target BG at 110.     Enc pt to increase basal rate at 12 am to 0.35 units/hr and 7 am to 0.6 units/hr (16% increase per MD standing order)     Pt felt comfortable with slight increase and will call office for additional adjustments.     Pt has glucose source close.   "

## 2024-11-07 NOTE — TELEPHONE ENCOUNTER
NURSE ANTICOAGULATION VISIT    12/28/2017     Angelica presents to the [facility] for her management of Warfarin therapy.    (Z51.81,  Z79.01) Encounter for monitoring coumadin therapy  (primary encounter diagnosis)  Plan: INR - POINT OF CARE    (Z95.2) H/O mitral valve replacement with mechanical valve  Plan: INR - POINT OF CARE    (I48.2) Chronic atrial fibrillation (CMS/HCC)  Plan: INR - POINT OF CARE       Status   Patient denies missing any doses or taking extra doses of the anticoagulant. Patient is not at her goal between 2.5-3.5.   Dietary   Patient reports dietary changes.  She has been eating more vitamin K containing foods.  Reviewed with patient how important it is to be consistent with Vitamin K foods and she verbalized understanding.   Medication   Medication changes were reviewed, including herbals, supplements, and over the counter medications.  Changes were reported by patient. Patient started Pravastatin 12/14. She has also been taking more Tylenol. According to [employer], Tylenol has the potential to increase the INR in patients who also take Warfarin, while Pravastatin should not alter the INR.  Current dosing of the Warfarin was verified and patient has been taking it as prescribed.    Alcohol   Patient denies changes in alcohol consumption.  Patient is aware that alcohol can alter the INR.  Review of Systems   SKIN: Denies any bruising or bleeding. No bruising or bleeding noted when the patient was in the clinic today.   PSYCHOLOGIC: Alert. Oriented x3 to person, place, and time. Patient is self-administering medication.   OTHER: Patient reports increased diarrhea. She had this evaluated by her PCP. There is a possibility of a colonoscopy in the future but patient states she isn't interested in this quite yet. Patient denies noting any blood in stool, urine, gums, or nose.  Plan of Care   (AM dosing) Hold Warfarin tomorrow, then 12/30 start new dosing schedule.      Hub staff attempted to follow warm transfer process and was unsuccessful     Caller: Na Handy    Relationship to patient: Self    Best call back number: [phone]     Patient is needing: PATIENT IS WANTING TO SPEAK WITH A DIABETES EDUCATOR. PT SPOKE WITH SOMEONE LAST WEEK ABOUT SWITCHING DEXCOM 6 TO  DEXCOM 7. THE LAST 2 MORNINGS BLOOD SUGARS HAVE BEEN IN THE 400S, SHE IS MOSTLY HAVING READINGS IN THE 200S. SHE DID DO AN UPDATE ON HER PUMP SETTINGS, BUT BLOOD SUGAR IS STILL RUNNING HIGH. TRANSPLANT TEAM DOES NOT WANT HER BLOOD SUGAR RUNNING OVER 200.  PLEASE CALL BACK TO ADVISE.      DIANDRA (OPEN ENROLLMENT) MUSTAPHA    Comments:         Anticoagulation Care Providers     Provider Role Specialty Phone number    Ricardo Nguyen MD Referring Cardiovascular Disease [phone]    Drew Cespedes NP  Nurse Practitioner [phone]          Patient was notified of Emerson'Ena Ayala RN

## 2024-11-13 ENCOUNTER — TELEPHONE (OUTPATIENT)
Dept: ENDOCRINOLOGY | Facility: CLINIC | Age: 69
End: 2024-11-13
Payer: MEDICARE

## 2024-11-13 NOTE — TELEPHONE ENCOUNTER
Returned pt call regarding tandem pump symbol questions.     Discussed meaning of symbols using Tandem guide book/website. Pt states blue and white boxes showing above insulin on board. Possible meanings pt needs to change insulin cartridge to possible occlusion. Per tandem report, pt received alert this AM CGM out of range. Instructed pt to call tandem customer service and provided number. Pt states current .     Enc pt if tube occluded and able to fix problem, may wish to only bolus before meals and decrease insulin delivery may have contributed to high BG after eating. Pt states she wishes to keep ICR 1:13 and bolus before meals. Enc pt to call office if still needs assistance after talking with customer support. Pt agreeable and voiced understanding.

## 2024-11-13 NOTE — TELEPHONE ENCOUNTER
Received call pt still having high BG. Most BG occur after eating. Pt without any recent lows. Pt had recent kidney transplant in October.     Pt has the current settings on pump:     Basal 12 AM 0.35 units/hr               7 AM 0.6 units/hr (basal rate for both settings increased 11/7)     ICR 1:15  Correction factor 1:85  Target   Pt states Control- IQ is on.     Pt had not been bolusing at least 15 min before every meal.     Pt to begin to bolus 15 min before each meal and change ICR from 1:15 to 1:13 (13% increase per MD standing order)     Pt to call if cont to office if cont to need adjustments.

## 2024-11-14 RX ORDER — ESOMEPRAZOLE MAGNESIUM 40 MG/1
CAPSULE, DELAYED RELEASE ORAL
Qty: 1 CAPSULE | Refills: 0 | OUTPATIENT
Start: 2024-11-14

## 2024-11-18 NOTE — TELEPHONE ENCOUNTER
Checked in with pt to double check with BG results. Pt states a couple of times BG has been on the line of low during the night but did not stay. Pt states adding HS snack has helped with lows. Not able to see tandem report. Not uploaded since Nov 13. Pt to upload to report to be reviewed tomorrow.     Pt states BG have improved. Trying to bolus before more. Pt taking HS snack. (Drinking boost/glucerna)    Discussed treating low BG however unable to see tandem report. Discussed decreasing basal at night, increasing protein to HS snack, pt uploading data for educator to review. Pt desires to upload data and educator to f/u with pt tomorrow.

## 2024-11-19 NOTE — TELEPHONE ENCOUNTER
Followed up with pt today. Tandem report was uploaded. Pt has not had any lows. BG still elevated after meals on some occasions. Rec to decrease the correction factor from 85 to 70 (12% change) per MD standing order.     Pt desires to make small changes at once.     Of note, pt states target BG set to 110 instead of 120 per tandem report.     Pt agreeable and voiced understanding.     Enc pt to call office if have low/high BG and needs further adjustments.    Pt agreeable and voiced understanding.

## 2024-12-10 ENCOUNTER — TELEPHONE (OUTPATIENT)
Dept: ENDOCRINOLOGY | Facility: CLINIC | Age: 69
End: 2024-12-10
Payer: MEDICARE

## 2024-12-10 NOTE — TELEPHONE ENCOUNTER
"Pt requesting her Tandem report be reviewed by Dr Mahoney to determine if changes are necessary at this time. Pt notes that \"blood sugar is not doing as good as transplant center wants them. Sometimes they get up to 200s.\" S/p kidney transplant. Pt notes a couple of recent lows, but \"not bad.\" Pt has noticed that BG starts to trend up in the middle of night and FBG usually ~200 mg/dL. Pt here next week for F/u with Dr Mahoney 12/17/24.       Basal rates   12 A 0.350  7A 0.6    Correction 1:70   ICR 1:13     Target   "

## 2024-12-11 ENCOUNTER — TELEPHONE (OUTPATIENT)
Dept: ENDOCRINOLOGY | Facility: CLINIC | Age: 69
End: 2024-12-11

## 2024-12-11 DIAGNOSIS — E03.9 ACQUIRED HYPOTHYROIDISM: ICD-10-CM

## 2024-12-11 DIAGNOSIS — E10.65 TYPE 1 DIABETES MELLITUS WITH HYPERGLYCEMIA: Primary | ICD-10-CM

## 2024-12-11 DIAGNOSIS — E78.2 MIXED HYPERLIPIDEMIA: ICD-10-CM

## 2024-12-11 RX ORDER — FERROUS SULFATE 325(65) MG
1 TABLET ORAL
COMMUNITY
Start: 2024-11-01

## 2024-12-11 RX ORDER — LANOLIN ALCOHOL/MO/W.PET/CERES
CREAM (GRAM) TOPICAL
COMMUNITY
Start: 2024-11-15

## 2024-12-11 RX ORDER — SUCRALFATE ORAL 1 G/10ML
SUSPENSION ORAL
COMMUNITY
Start: 2024-10-11 | End: 2024-12-17

## 2024-12-11 RX ORDER — TORSEMIDE 20 MG/1
TABLET ORAL
COMMUNITY
Start: 2024-11-04 | End: 2024-12-17

## 2024-12-11 RX ORDER — SULFAMETHOXAZOLE AND TRIMETHOPRIM 400; 80 MG/1; MG/1
TABLET ORAL
COMMUNITY
Start: 2024-11-15

## 2024-12-11 RX ORDER — FAMOTIDINE 20 MG/1
TABLET, FILM COATED ORAL
COMMUNITY
Start: 2024-11-19

## 2024-12-11 RX ORDER — NIFEDIPINE 30 MG/1
TABLET, EXTENDED RELEASE ORAL
COMMUNITY
Start: 2024-11-05

## 2024-12-11 RX ORDER — VALGANCICLOVIR 450 MG/1
TABLET, FILM COATED ORAL
COMMUNITY
Start: 2024-11-28 | End: 2024-12-17

## 2024-12-11 RX ORDER — ONDANSETRON 4 MG/1
TABLET, ORALLY DISINTEGRATING ORAL
COMMUNITY
Start: 2024-10-11

## 2024-12-11 RX ORDER — CEPHALEXIN 500 MG/1
CAPSULE ORAL
COMMUNITY
Start: 2024-10-25 | End: 2024-12-17

## 2024-12-11 NOTE — TELEPHONE ENCOUNTER
Sent orders to provider, LVM to inform pt once provider signs she can go to any [facility] facility to get labs drawn before follow up and if she needs them sent to outside lab call us back and let us know.

## 2024-12-11 NOTE — TELEPHONE ENCOUNTER
Provider: ANTONI    Caller: Na Handy    Relationship to Patient: Self    Phone Number: [phone]    Reason for Call: PATIENT HAS AN APPT SCHEDULED FOR 12/17/2024 AND WANTS TO KNOW IF SHE NEEDS LABS BEFOREHAND. PLEASE CALL TO ADVISE

## 2024-12-12 ENCOUNTER — TELEPHONE (OUTPATIENT)
Dept: ENDOCRINOLOGY | Facility: CLINIC | Age: 69
End: 2024-12-12

## 2024-12-12 ENCOUNTER — HOME MONITORING ENCOUNTER (OUTPATIENT)
Dept: ENDOCRINOLOGY | Facility: CLINIC | Age: 69
End: 2024-12-12
Payer: MEDICARE

## 2024-12-12 DIAGNOSIS — E10.65 TYPE 1 DIABETES MELLITUS WITH HYPERGLYCEMIA: Primary | ICD-10-CM

## 2024-12-12 PROCEDURE — 95251 CONT GLUC MNTR ANALYSIS I&R: CPT | Performed by: INTERNAL MEDICINE

## 2024-12-12 NOTE — PROGRESS NOTES
Chief complaint: Uncontrolled type 1 diabetes    Dexcom download interpretation: Dates covered was between November 27, 2024 to December 10, 2024.  Average blood sugar was 168.  Spending 67% in the range and 33% above range.  0% below range blood glucose graph did not show significant fluctuations.    Current pump settings are as follows:   Basal rates   12 A 0.350  7A 0.6     Correction 1:70   ICR 1:13      Target     Recommendations: Change basal rates as follows:  1.  At midnight 0.40 units/h, at 7 AM 0.650 units/h.  2.  Continue rest of the pump settings as is  3.  Always keep glucose source in case of low blood sugars.

## 2024-12-12 NOTE — TELEPHONE ENCOUNTER
Caller: Na Handy    Relationship: Self    Best call back number: [phone]    What orders are you requesting (i.e. lab or imaging): LABS    In what timeframe would the patient need to come in: ASAP    Where will you receive your lab/imaging services: [facility] [place].    Additional notes: PLEASE CALL PT WHEN SENT

## 2024-12-13 NOTE — TELEPHONE ENCOUNTER
Caller: KINGS DAUGHTER    Relationship to patient: PCP    Best call back number: [phone] ASK FOR GEORGETTE    Patient is needing: IDA ARROYO IS CALLING BECAUSE ONE OF THE PAGES SHE CAN NOT MAKE OUT FROM WHERE IT WAS FAXED OVER. SHE NEEDS THEM REFAXED OVER. FAX# [phone]

## 2024-12-17 ENCOUNTER — OFFICE VISIT (OUTPATIENT)
Dept: ENDOCRINOLOGY | Facility: CLINIC | Age: 69
End: 2024-12-17
Payer: MEDICARE

## 2024-12-17 VITALS
SYSTOLIC BLOOD PRESSURE: 130 MMHG | OXYGEN SATURATION: 97 % | HEIGHT: 62 IN | DIASTOLIC BLOOD PRESSURE: 72 MMHG | BODY MASS INDEX: 24.48 KG/M2 | WEIGHT: 133 LBS | HEART RATE: 73 BPM

## 2024-12-17 DIAGNOSIS — E11.42 DIABETIC PERIPHERAL NEUROPATHY: ICD-10-CM

## 2024-12-17 DIAGNOSIS — E03.9 ACQUIRED HYPOTHYROIDISM: ICD-10-CM

## 2024-12-17 DIAGNOSIS — E78.2 MIXED HYPERLIPIDEMIA: ICD-10-CM

## 2024-12-17 DIAGNOSIS — Z94.0 RENAL TRANSPLANT, STATUS POST: ICD-10-CM

## 2024-12-17 DIAGNOSIS — E10.65 TYPE 1 DIABETES MELLITUS WITH HYPERGLYCEMIA: Primary | ICD-10-CM

## 2024-12-17 DIAGNOSIS — I10 BENIGN HYPERTENSION: ICD-10-CM

## 2024-12-17 PROCEDURE — 95251 CONT GLUC MNTR ANALYSIS I&R: CPT | Performed by: INTERNAL MEDICINE

## 2024-12-17 PROCEDURE — 1159F MED LIST DOCD IN RCRD: CPT | Performed by: INTERNAL MEDICINE

## 2024-12-17 PROCEDURE — 99214 OFFICE O/P EST MOD 30 MIN: CPT | Performed by: INTERNAL MEDICINE

## 2024-12-17 PROCEDURE — 3078F DIAST BP <80 MM HG: CPT | Performed by: INTERNAL MEDICINE

## 2024-12-17 PROCEDURE — 3075F SYST BP GE 130 - 139MM HG: CPT | Performed by: INTERNAL MEDICINE

## 2024-12-17 PROCEDURE — 1160F RVW MEDS BY RX/DR IN RCRD: CPT | Performed by: INTERNAL MEDICINE

## 2024-12-17 RX ORDER — GLUCAGON 3 MG/1
3 POWDER NASAL AS NEEDED
Qty: 1 EACH | Refills: 3 | Status: SHIPPED | OUTPATIENT
Start: 2024-12-17

## 2024-12-17 RX ORDER — TACROLIMUS 1 MG/1
1 CAPSULE ORAL 2 TIMES DAILY
COMMUNITY

## 2024-12-17 RX ORDER — CARBOXYMETHYLCELLULOSE SODIUM 5 MG/ML
1 SOLUTION/ DROPS OPHTHALMIC
COMMUNITY

## 2024-12-17 RX ORDER — MYCOPHENOLATE MOFETIL 250 MG/1
250 CAPSULE ORAL 2 TIMES DAILY
COMMUNITY

## 2024-12-17 RX ORDER — AMOXICILLIN 250 MG
1 CAPSULE ORAL DAILY PRN
COMMUNITY
Start: 2024-10-07

## 2024-12-17 NOTE — PATIENT INSTRUCTIONS
Continue current insulin pump settings  Always keep glucose source in case of low blood sugar  Labs before follow-up.

## 2024-12-17 NOTE — PROGRESS NOTES
Endocrine Progress Note Outpatient     Patient Care Team:  Denis Scanlon DO as PCP - General (Internal Medicine)    Chief Complaint: Follow-up type 1 diabetes    HPI: 69-year-old female with previous diagnosis of type 1 diabetes, hypertension, hyperlipidemia and hypothyroidism is here for follow-up.    For type 1 diabetes: She is currently on the T-slim insulin pump with Dexcom monitoring system.  Current basal rates are as follows midnight 0.40 units/h, at 7 AM is 0.65 units/h.  Insulin carb ratio at midnight is 1 unit for each 13 g of carbohydrate.  Insulin correction factor is 1 unit for each 70 mg BS with a target blood sugar of 120.  Active insulin is 5 hours.    Since last seen she has not been hospitalized with low or high blood sugars.    Hypertension: Well controlled.     Hyperlipidemia: On Crestor.     Hypothyroidism: On levothyroxine supplementation    SP renal transplant in 10/2024. Working good.     Past Medical History:   Diagnosis Date    Allergies     YEAR ROUND    Arthritis     Benign hypertension     Bleeding of eye     Retinal bleed - Laser Treatment    CKD (chronic kidney disease) stage 3, GFR 30-59 ml/min     Diabetes     Diabetic neuropathy     GERD (gastroesophageal reflux disease)     History of tobacco use     2.5 PPD; 1111-0673    Hyperlipidemia     Hypothyroidism     Inflammatory polyarthritis     Lupus     Neuropathy     Primary osteoarthritis     Retinal hemorrhage 2015    [facility] - LASER TREATMENT    Rheumatoid arthritis 2005    Wrist fracture     Zoster without complications 2022       Social History     Socioeconomic History    Marital status: Single    Number of children: 0    Years of education: 14   Tobacco Use    Smoking status: Former     Current packs/day: 0.00     Average packs/day: 2.5 packs/day for 24.0 years (60.0 ttl pk-yrs)     Types: Cigarettes     Start date:      Quit date:      Years since quittin.9     Passive exposure:  Past    Smokeless tobacco: Never   Vaping Use    Vaping status: Never Used   Substance and Sexual Activity    Alcohol use: Not Currently    Drug use: Defer    Sexual activity: Defer       Family History   Problem Relation Age of Onset    Heart disease Mother     Hypertension Father     Heart disease Father     Diabetes Maternal Grandmother        Allergies   Allergen Reactions    Azithromycin GI Intolerance and Other (See Comments)     nausea  nausea      Adhesive Tape Rash    Lisinopril Cough     Other reaction(s): Cough    Losartan Cough     Other reaction(s): Cough       ROS:   Constitutional:  Denies fatigue, tiredness.    Eyes:  Denies change in visual acuity   HENT:  Denies nasal congestion or sore throat   Respiratory: denies cough, shortness of breath.   Cardiovascular:  denies chest pain, edema   GI:  Denies abdominal pain, nausea, vomiting.   Musculoskeletal:  Denies back pain or joint pain   Integument:  Denies dry skin and rash   Neurologic:  Denies headache, focal weakness or sensory changes   Endocrine:  Denies polyuria or polydipsia   Psychiatric:  Denies depression or anxiety      Vitals:    12/17/24 1406   BP: 130/72   Pulse: 73   SpO2: 97%       Body mass index is 24.33 kg/m².     Physical Exam:  GEN: NAD, conversant  CV: RRR,   LUNG: CTA  PSYCH: AOX3, appropriate mood, affect normal      Results Review:     I reviewed the patient's new clinical results.    TSH+Free T4 (12/16/2024 06:16)    Comprehensive Metabolic Panel (12/16/2024 06:16)    Microalbumin / Creatinine Urine Ratio - Urine, Clean Catch (12/13/2024)    Lipid Panel (12/13/2024)    Hemoglobin A1c (12/13/2024)    HEMOGLOBIN A1C (01/10/2024 14:49)    Dexcom download interpretation: Dates covered was between November 27, 2024 to December 10, 2024.  Average blood sugar was 168.  Spending 67% in the range and 33% above range and 0% below range.  Glucose graph did not show significant fluctuations.  Medication Review: Reviewed.       Current  Outpatient Medications:     acetaminophen (TYLENOL) 650 MG 8 hr tablet, Take 1 tablet by mouth Daily As Needed for Mild Pain, Moderate Pain or Headache., Disp: , Rfl:     aspirin 81 MG EC tablet, Take 1 tablet by mouth Daily., Disp: , Rfl:     Baqsimi One Pack 3 MG/DOSE powder, USE ONE SPRAY IN THE NOSTRILS AS DIRECTED BY THE PROVIDER IN THE CASE OF LOW BLOOD SUGARS, Disp: 1 each, Rfl: 3    BD Pen Needle Lupe 2nd Gen 32G X 4 MM misc, Pt to use with insulin pens 5 times daily, Disp: 150 each, Rfl: 1    Blood Glucose Monitoring Suppl (ONE TOUCH ULTRA 2) w/Device kit, ONETOUCH ULTRA 2 w/Device KIT, Disp: , Rfl:     DULoxetine (CYMBALTA) 30 MG capsule, Take 1 capsule by mouth Daily., Disp: , Rfl:     famotidine (PEPCID) 20 MG tablet, , Disp: , Rfl:     FeroSul 325 (65 Fe) MG tablet, Take 1 tablet by mouth Daily With Breakfast., Disp: , Rfl:     fluticasone (FLONASE) 50 MCG/ACT nasal spray, , Disp: , Rfl:     gabapentin (NEURONTIN) 100 MG capsule, Take 1 capsule by mouth Daily., Disp: , Rfl:     glucose blood test strip, ONETOUCH ULTRA BLUE STRP, Disp: , Rfl:     guaiFENesin (MUCINEX) 600 MG 12 hr tablet, Take 1 tablet by mouth 2 (Two) Times a Day As Needed for Cough., Disp: 30 tablet, Rfl: 0    hydroxychloroquine (PLAQUENIL) 200 MG tablet, Take 1 tablet by mouth 2 (Two) Times a Day., Disp: , Rfl:     Insulin Lispro (HumaLOG) 100 UNIT/ML injection, For use an insulin pump.  Max daily dose is 100 units/day., Disp: 30 mL, Rfl: 6    Lancets (onetouch ultrasoft) lancets, ONETOUCH ULTRASOFT LANCETS, Disp: , Rfl:     levothyroxine (SYNTHROID, LEVOTHROID) 150 MCG tablet, TAKE 1 TABLET BY MOUTH DAILY, Disp: 90 tablet, Rfl: 1    lidocaine (XYLOCAINE) 5 % ointment, , Disp: , Rfl:     lidocaine-prilocaine (EMLA) 2.5-2.5 % cream, APPLY SMALL AMOUNT TOPICALLY TO SKIN AS DIRECTED 1 HOURS BEFORE APPT, Disp: , Rfl:     Magnesium Oxide -Mg Supplement 400 (240 Mg) MG tablet, , Disp: , Rfl:     mupirocin (BACTROBAN) 2 % ointment, Apply   topically to the appropriate area as directed See Admin Instructions. Apply topically to the affected area twice daily, Disp: , Rfl:     mycophenolate (CELLCEPT) 250 MG capsule, Take 1 capsule by mouth 2 (Two) Times a Day., Disp: , Rfl:     NIFEdipine XL (PROCARDIA XL) 30 MG 24 hr tablet, , Disp: , Rfl:     ondansetron ODT (ZOFRAN-ODT) 4 MG disintegrating tablet, , Disp: , Rfl:     polyethylene glycol (MIRALAX) 17 g packet, Take 17 g by mouth Daily As Needed., Disp: , Rfl:     rosuvastatin (CRESTOR) 20 MG tablet, Take 1 tablet by mouth Every Night., Disp: 90 tablet, Rfl: 3    sennosides-docusate (PERICOLACE) 8.6-50 MG per tablet, Take 1 tablet by mouth Daily As Needed., Disp: , Rfl:     sulfamethoxazole-trimethoprim (BACTRIM,SEPTRA) 400-80 MG tablet, , Disp: , Rfl:     carboxymethylcellulose (REFRESH PLUS) 0.5 % solution, Apply 1 drop to eye(s) as directed by provider., Disp: , Rfl:     tacrolimus (PROGRAF) 1 MG capsule, Take 1 capsule by mouth 2 (Two) Times a Day. 3 in he am ad 2 in the pm, Disp: , Rfl:       Assessment & Plan   1.  Diabetes mellitus type 1 with Hyperglycemia: Overall doing well with A1c at 5.7%.  Will continue current management.  Advised to always keep glucose source in case of low blood sugars and continue to work on diet and activity.      2.  Hypertension: Well-controlled, continue current management.    3.  Hyperlipidemia: Uncontrolled with high LDL, she tells me that she is taking her rosuvastatin 20 mg p.o. daily.  Will continue that and follow lipid panel.    4.  Hypothyroidism: Well-controlled, TSH and free T4 normal.  We will continue current dose of levothyroxine.    5.  End-stage renal disease: SP renal transplant.     6.  Diabetic peripheral neuropathy: She has bilateral bunions, she will benefit from diabetic shoes.    Assessment and plan from September 26, 2024 reviewed and updated.           Jo Mahoney MD FACE.

## 2025-02-20 RX ORDER — INSULIN LISPRO 100 [IU]/ML
INJECTION, SOLUTION INTRAVENOUS; SUBCUTANEOUS
Qty: 30 ML | Refills: 6 | Status: SHIPPED | OUTPATIENT
Start: 2025-02-20

## 2025-03-17 ENCOUNTER — TELEPHONE (OUTPATIENT)
Dept: ENDOCRINOLOGY | Facility: CLINIC | Age: 70
End: 2025-03-17
Payer: MEDICARE

## 2025-03-17 NOTE — TELEPHONE ENCOUNTER
Hub staff attempted to follow warm transfer process and was unsuccessful     Caller: Na Handy    Relationship to patient: Self    Best call back number: [phone]     Patient is needing: PT WANTS LAB RESULTS SENT TO [facility]. CALL PT BACK ONCE SENT.

## 2025-03-24 RX ORDER — DAPSONE 100 MG/1
TABLET ORAL
COMMUNITY
Start: 2025-03-17

## 2025-03-24 RX ORDER — ESOMEPRAZOLE MAGNESIUM 40 MG/1
40 CAPSULE, DELAYED RELEASE ORAL DAILY
COMMUNITY
Start: 2025-01-23

## 2025-03-25 ENCOUNTER — OFFICE VISIT (OUTPATIENT)
Dept: ENDOCRINOLOGY | Facility: CLINIC | Age: 70
End: 2025-03-25
Payer: MEDICARE

## 2025-03-25 VITALS
SYSTOLIC BLOOD PRESSURE: 125 MMHG | BODY MASS INDEX: 24.66 KG/M2 | HEART RATE: 78 BPM | HEIGHT: 62 IN | DIASTOLIC BLOOD PRESSURE: 80 MMHG | WEIGHT: 134 LBS | OXYGEN SATURATION: 92 %

## 2025-03-25 DIAGNOSIS — E10.65 TYPE 1 DIABETES MELLITUS WITH HYPERGLYCEMIA: Primary | ICD-10-CM

## 2025-03-25 DIAGNOSIS — I10 BENIGN HYPERTENSION: ICD-10-CM

## 2025-03-25 DIAGNOSIS — E78.2 MIXED HYPERLIPIDEMIA: ICD-10-CM

## 2025-03-25 DIAGNOSIS — E11.42 DIABETIC PERIPHERAL NEUROPATHY: ICD-10-CM

## 2025-03-25 DIAGNOSIS — E03.9 ACQUIRED HYPOTHYROIDISM: ICD-10-CM

## 2025-03-25 PROCEDURE — 99214 OFFICE O/P EST MOD 30 MIN: CPT | Performed by: INTERNAL MEDICINE

## 2025-03-25 PROCEDURE — 3074F SYST BP LT 130 MM HG: CPT | Performed by: INTERNAL MEDICINE

## 2025-03-25 PROCEDURE — 95251 CONT GLUC MNTR ANALYSIS I&R: CPT | Performed by: INTERNAL MEDICINE

## 2025-03-25 PROCEDURE — 1160F RVW MEDS BY RX/DR IN RCRD: CPT | Performed by: INTERNAL MEDICINE

## 2025-03-25 PROCEDURE — 3079F DIAST BP 80-89 MM HG: CPT | Performed by: INTERNAL MEDICINE

## 2025-03-25 PROCEDURE — 1159F MED LIST DOCD IN RCRD: CPT | Performed by: INTERNAL MEDICINE

## 2025-03-25 RX ORDER — ONDANSETRON 4 MG/1
4 TABLET, FILM COATED ORAL
COMMUNITY

## 2025-03-25 RX ORDER — PREDNISONE 5 MG/1
5 TABLET ORAL DAILY
COMMUNITY
Start: 2025-01-21

## 2025-03-25 RX ORDER — CETIRIZINE HYDROCHLORIDE 10 MG/1
10 TABLET ORAL DAILY
COMMUNITY
Start: 2025-02-25 | End: 2025-05-26

## 2025-03-25 RX ORDER — NIFEDIPINE 30 MG/1
30 TABLET, EXTENDED RELEASE ORAL 2 TIMES DAILY
Qty: 180 TABLET | Refills: 3 | Status: SHIPPED | OUTPATIENT
Start: 2025-03-25

## 2025-03-25 RX ORDER — LEVOTHYROXINE SODIUM 150 UG/1
150 TABLET ORAL DAILY
Qty: 90 TABLET | Refills: 1 | Status: SHIPPED | OUTPATIENT
Start: 2025-03-25

## 2025-03-25 RX ORDER — ROSUVASTATIN CALCIUM 20 MG/1
20 TABLET, COATED ORAL NIGHTLY
Qty: 90 TABLET | Refills: 3 | Status: SHIPPED | OUTPATIENT
Start: 2025-03-25

## 2025-03-25 RX ORDER — PYRIDOXINE HCL (VITAMIN B6) 100 MG
100 TABLET ORAL DAILY
COMMUNITY

## 2025-03-25 RX ORDER — IBUPROFEN 600 MG/1
1 TABLET ORAL ONCE AS NEEDED
Qty: 1 EACH | Refills: 1 | Status: SHIPPED | OUTPATIENT
Start: 2025-03-25

## 2025-03-25 RX ORDER — WHEAT DEXTRIN/ASPARTAME 3 G/6 G
POWDER IN PACKET (EA) ORAL DAILY
COMMUNITY

## 2025-03-25 RX ORDER — PHENOL 1.4 %
500 AEROSOL, SPRAY (ML) MUCOUS MEMBRANE DAILY
COMMUNITY

## 2025-03-25 RX ORDER — MAGNESIUM HYDROXIDE/ALUMINUM HYDROXICE/SIMETHICONE 120; 1200; 1200 MG/30ML; MG/30ML; MG/30ML
10 SUSPENSION ORAL
COMMUNITY

## 2025-03-25 NOTE — PROGRESS NOTES
Endocrine Progress Note Outpatient     Patient Care Team:  Denis Scanlon DO as PCP - General (Internal Medicine)    Chief Complaint: Follow-up type 1 diabetes    HPI: 69-year-old female with previous diagnosis of type 1 diabetes, hypertension, hyperlipidemia and hypothyroidism is here for follow-up.    For type 1 diabetes: She is currently on the T-slim insulin pump with Dexcom monitoring system.  Current basal rates are as follows midnight 0.40 units/h, at 7 AM is 0.65 units/h.  Insulin carb ratio at midnight is 1 unit for each 13 g of carbohydrate.  Insulin correction factor is 1 unit for each 70 mg BS with a target blood sugar of 120.  Active insulin is 5 hours.    Since last seen she has not been hospitalized with low or high blood sugars.    Hypertension: Well controlled.     Hyperlipidemia: On Crestor.     Hypothyroidism: On levothyroxine supplementation    SP renal transplant in 10/2024. Working good.     Past Medical History:   Diagnosis Date    Allergies     YEAR ROUND    Arthritis     Benign hypertension     Bleeding of eye     Retinal bleed - Laser Treatment    CKD (chronic kidney disease) stage 3, GFR 30-59 ml/min     Diabetes     Diabetic neuropathy     GERD (gastroesophageal reflux disease)     History of tobacco use     2.5 PPD; [id number]    Hyperlipidemia     Hypothyroidism     Inflammatory polyarthritis     Lupus     Neuropathy     Primary osteoarthritis     Retinal hemorrhage 2015    [facility] - LASER TREATMENT    Rheumatoid arthritis 2005    Wrist fracture     Zoster without complications 2022       Social History     Socioeconomic History    Marital status: Single    Number of children: 0    Years of education: 14   Tobacco Use    Smoking status: Former     Current packs/day: 0.00     Average packs/day: 2.5 packs/day for 24.0 years (60.0 ttl pk-yrs)     Types: Cigarettes     Start date:      Quit date:      Years since quittin.2     Passive exposure:  Past    Smokeless tobacco: Never   Vaping Use    Vaping status: Never Used   Substance and Sexual Activity    Alcohol use: Not Currently    Drug use: Defer    Sexual activity: Defer       Family History   Problem Relation Age of Onset    Heart disease Mother     Hypertension Father     Heart disease Father     Diabetes Maternal Grandmother        Allergies   Allergen Reactions    Azithromycin GI Intolerance and Other (See Comments)     nausea  nausea      Adhesive Tape Rash    Lisinopril Cough     Other reaction(s): Cough    Losartan Cough     Other reaction(s): Cough       ROS:   Constitutional:  Denies fatigue, tiredness.    Eyes:  Denies change in visual acuity   HENT:  Denies nasal congestion or sore throat   Respiratory: denies cough, shortness of breath.   Cardiovascular:  denies chest pain, edema   GI:  Denies abdominal pain, nausea, vomiting.   Musculoskeletal:  Denies back pain or joint pain   Integument:  Denies dry skin and rash   Neurologic:  Denies headache, focal weakness or sensory changes   Endocrine:  Denies polyuria or polydipsia   Psychiatric:  Denies depression or anxiety      Vitals:    03/25/25 1307   BP: 125/80   Pulse: 78   SpO2: 92%       Body mass index is 24.51 kg/m².     Physical Exam:  GEN: NAD, conversant  CV: RRR,   LUNG: CTA  PSYCH: AOX3, appropriate mood, affect normal      Results Review:     I reviewed the patient's new clinical results.    T4, FREE (03/19/2025 10:53)    TSH (03/19/2025 10:53)    MicroAlbumin, Urine, Random - (03/19/2025 10:53)    COMPREHENSIVE METABOLIC PANEL (03/19/2025 10:53)    LIPID PANEL (03/19/2025 10:53)    HEMOGLOBIN A1C (03/19/2025 10:53)    Dexcom download interpretation: Dates covered was between February 26, 2025 to March 25, 2025.  Average blood sugar was 180.  Spending about 55% in the range and 45% above range.  Glucose graph did not show significant fluctuations.    Medication Review: Reviewed.       Current Outpatient Medications:      acetaminophen (TYLENOL) 650 MG 8 hr tablet, Take 1 tablet by mouth Daily As Needed for Mild Pain, Moderate Pain or Headache., Disp: , Rfl:     aspirin 81 MG EC tablet, Take 1 tablet by mouth Daily., Disp: , Rfl:     Blood Glucose Monitoring Suppl (ONE TOUCH ULTRA 2) w/Device kit, ONETOUCH ULTRA 2 w/Device KIT, Disp: , Rfl:     carboxymethylcellulose (REFRESH PLUS) 0.5 % solution, Apply 1 drop to eye(s) as directed by provider., Disp: , Rfl:     cetirizine (zyrTEC) 10 MG tablet, Take 1 tablet by mouth Daily., Disp: , Rfl:     dapsone 100 MG tablet, , Disp: , Rfl:     DULoxetine (CYMBALTA) 30 MG capsule, Take 1 capsule by mouth Daily., Disp: , Rfl:     esomeprazole (nexIUM) 40 MG capsule, Take 1 capsule by mouth Daily., Disp: , Rfl:     famotidine (PEPCID) 20 MG tablet, , Disp: , Rfl:     FeroSul 325 (65 Fe) MG tablet, Take 1 tablet by mouth Daily With Breakfast., Disp: , Rfl:     fluticasone (FLONASE) 50 MCG/ACT nasal spray, , Disp: , Rfl:     gabapentin (NEURONTIN) 100 MG capsule, Take 1 capsule by mouth Daily., Disp: , Rfl:     glucose blood test strip, ONETOUCH ULTRA BLUE STRP, Disp: , Rfl:     guaiFENesin (MUCINEX) 600 MG 12 hr tablet, Take 1 tablet by mouth 2 (Two) Times a Day As Needed for Cough., Disp: 30 tablet, Rfl: 0    hydroxychloroquine (PLAQUENIL) 200 MG tablet, Take 1 tablet by mouth 2 (Two) Times a Day., Disp: , Rfl:     Insulin Lispro (HumaLOG) 100 UNIT/ML injection, For use an insulin pump.  Max daily dose is 100 units/day. DX E10.65, Disp: 30 mL, Rfl: 6    Lancets (onetouch ultrasoft) lancets, ONETOUCH ULTRASOFT LANCETS, Disp: , Rfl:     levothyroxine (SYNTHROID, LEVOTHROID) 150 MCG tablet, TAKE 1 TABLET BY MOUTH DAILY, Disp: 90 tablet, Rfl: 1    lidocaine-prilocaine (EMLA) 2.5-2.5 % cream, APPLY SMALL AMOUNT TOPICALLY TO SKIN AS DIRECTED 1 HOURS BEFORE APPT, Disp: , Rfl:     Magnesium Oxide -Mg Supplement 400 (240 Mg) MG tablet, , Disp: , Rfl:     multivitamin with minerals (PRESERVISION AREDS PO),  Take 1 tablet by mouth Daily., Disp: , Rfl:     mupirocin (BACTROBAN) 2 % ointment, Apply  topically to the appropriate area as directed See Admin Instructions. Apply topically to the affected area twice daily, Disp: , Rfl:     NIFEdipine XL (PROCARDIA XL) 30 MG 24 hr tablet, , Disp: , Rfl:     ondansetron ODT (ZOFRAN-ODT) 4 MG disintegrating tablet, , Disp: , Rfl:     polyethylene glycol (MIRALAX) 17 g packet, Take 17 g by mouth Daily As Needed., Disp: , Rfl:     predniSONE (DELTASONE) 5 MG tablet, Take 1 tablet by mouth Daily., Disp: , Rfl:     pyridoxine (VITAMIN B-6) 100 MG tablet tablet, Take 1 tablet by mouth Daily., Disp: , Rfl:     rosuvastatin (CRESTOR) 20 MG tablet, Take 1 tablet by mouth Every Night., Disp: 90 tablet, Rfl: 3    sennosides-docusate (PERICOLACE) 8.6-50 MG per tablet, Take 1 tablet by mouth Daily As Needed., Disp: , Rfl:     tacrolimus (PROGRAF) 1 MG capsule, Take 1 capsule by mouth 2 (Two) Times a Day. 3 in he am ad 2 in the pm, Disp: , Rfl:     aluminum-magnesium hydroxide-simethicone (MAALOX/MYLANTA) 200-200-20 MG/5ML suspension, Take 10 mL by mouth., Disp: , Rfl:     calcium carbonate (OS-MARIALUISA) 600 MG tablet, Take 500 mg by mouth Daily., Disp: , Rfl:     ondansetron (ZOFRAN) 4 MG tablet, Take 1 tablet by mouth., Disp: , Rfl:     Wheat Dextrin (Benefiber Drink Mix) pack, Take  by mouth Daily., Disp: , Rfl:       Assessment & Plan   1.  Diabetes mellitus type 1 with Hyperglycemia: Uncontrolled with A1c at 7.2%.  I have changed her basal rate at 7 AM to 0.70 units/h.  Rest of the pump settings will stay as it is.  Continue to work on diet and activity and will follow blood sugars and A1c.  Advised to always keep glucose source in case of low blood sugars and continue to work on diet and activity.      2.  Hypertension: Well-controlled, continue current management.    3.  Hyperlipidemia: Well-controlled, continue rosuvastatin.    4.  Hypothyroidism: Well-controlled, TSH and free T4 normal.   We will continue current dose of levothyroxine.    5.  End-stage renal disease: SP renal transplant.     6.  Diabetic peripheral neuropathy: She has bilateral bunions, she will benefit from diabetic shoes.      Assessment and plan from December 17, 2024 reviewed and updated         Jo Mahoney MD FACE.

## 2025-03-25 NOTE — PATIENT INSTRUCTIONS
Continue current management  Watch for low blood sugar since I have changed the insulin pump settings  Always keep glucose source in case of low blood sugar  Labs before follow-up.

## 2025-04-10 ENCOUNTER — TELEPHONE (OUTPATIENT)
Dept: ENDOCRINOLOGY | Facility: CLINIC | Age: 70
End: 2025-04-10
Payer: MEDICARE

## 2025-05-06 RX ORDER — LEVOTHYROXINE SODIUM 150 UG/1
150 TABLET ORAL DAILY
Qty: 90 TABLET | Refills: 0 | Status: SHIPPED | OUTPATIENT
Start: 2025-05-06

## 2025-06-24 ENCOUNTER — OFFICE VISIT (OUTPATIENT)
Dept: ENDOCRINOLOGY | Facility: CLINIC | Age: 70
End: 2025-06-24
Payer: MEDICARE

## 2025-06-24 VITALS
SYSTOLIC BLOOD PRESSURE: 125 MMHG | HEIGHT: 62 IN | DIASTOLIC BLOOD PRESSURE: 75 MMHG | BODY MASS INDEX: 24.84 KG/M2 | HEART RATE: 74 BPM | OXYGEN SATURATION: 93 % | WEIGHT: 135 LBS

## 2025-06-24 DIAGNOSIS — E10.65 TYPE 1 DIABETES MELLITUS WITH HYPERGLYCEMIA: Primary | ICD-10-CM

## 2025-06-24 DIAGNOSIS — I10 BENIGN HYPERTENSION: ICD-10-CM

## 2025-06-24 DIAGNOSIS — Z94.0 RENAL TRANSPLANT, STATUS POST: ICD-10-CM

## 2025-06-24 DIAGNOSIS — E11.42 DIABETIC PERIPHERAL NEUROPATHY: ICD-10-CM

## 2025-06-24 DIAGNOSIS — E03.9 ACQUIRED HYPOTHYROIDISM: ICD-10-CM

## 2025-06-24 DIAGNOSIS — E78.2 MIXED HYPERLIPIDEMIA: ICD-10-CM

## 2025-06-24 PROCEDURE — 1160F RVW MEDS BY RX/DR IN RCRD: CPT | Performed by: INTERNAL MEDICINE

## 2025-06-24 PROCEDURE — 3074F SYST BP LT 130 MM HG: CPT | Performed by: INTERNAL MEDICINE

## 2025-06-24 PROCEDURE — 3078F DIAST BP <80 MM HG: CPT | Performed by: INTERNAL MEDICINE

## 2025-06-24 PROCEDURE — 99214 OFFICE O/P EST MOD 30 MIN: CPT | Performed by: INTERNAL MEDICINE

## 2025-06-24 PROCEDURE — 1159F MED LIST DOCD IN RCRD: CPT | Performed by: INTERNAL MEDICINE

## 2025-06-24 PROCEDURE — 95251 CONT GLUC MNTR ANALYSIS I&R: CPT | Performed by: INTERNAL MEDICINE

## 2025-06-24 RX ORDER — LIDOCAINE 50 MG/G
PATCH TOPICAL
COMMUNITY
Start: 2025-06-16

## 2025-06-24 NOTE — PROGRESS NOTES
Endocrine Progress Note Outpatient     Patient Care Team:  Denis Scanlon DO as PCP - General (Internal Medicine)    Chief Complaint: Follow-up type 1 diabetes    HPI: 70-year-old female with previous diagnosis of type 1 diabetes, hypertension, hyperlipidemia and hypothyroidism is here for follow-up.    For type 1 diabetes: She is currently on the T-slim insulin pump with Dexcom monitoring system.  Current basal rates are as follows midnight 0.40 units/h, at 7 AM is 0.70 units/h.  Insulin carb ratio at midnight is 1 unit for each 13 g of carbohydrate.  Insulin correction factor is 1 unit for each 70 mg BS with a target blood sugar of 120.  Active insulin is 5 hours.    Since last seen she has not been hospitalized with low or high blood sugars.    Hypertension: Well controlled.     Hyperlipidemia: On Crestor.     Hypothyroidism: On levothyroxine supplementation    SP renal transplant in 10/2024. Working good.     Past Medical History:   Diagnosis Date    Allergies     YEAR ROUND    Arthritis     Benign hypertension     Bleeding of eye     Retinal bleed - Laser Treatment    CKD (chronic kidney disease) stage 3, GFR 30-59 ml/min     Diabetes     Diabetic neuropathy     GERD (gastroesophageal reflux disease)     History of tobacco use     2.5 PPD; [id number]    Hyperlipidemia     Hypothyroidism     Inflammatory polyarthritis     Lupus     Neuropathy     Primary osteoarthritis     Retinal hemorrhage 2015    [facility] - LASER TREATMENT    Rheumatoid arthritis 2005    Wrist fracture     Zoster without complications 2022       Social History     Socioeconomic History    Marital status: Single    Number of children: 0    Years of education: 14   Tobacco Use    Smoking status: Former     Current packs/day: 0.00     Average packs/day: 2.5 packs/day for 24.0 years (60.0 ttl pk-yrs)     Types: Cigarettes     Start date:      Quit date:      Years since quittin.4     Passive exposure:  Past    Smokeless tobacco: Never   Vaping Use    Vaping status: Never Used   Substance and Sexual Activity    Alcohol use: Not Currently    Drug use: Defer    Sexual activity: Defer       Family History   Problem Relation Age of Onset    Heart disease Mother     Hypertension Father     Heart disease Father     Diabetes Maternal Grandmother        Allergies   Allergen Reactions    Azithromycin GI Intolerance and Other (See Comments)     nausea  nausea      Adhesive Tape Rash    Lisinopril Cough     Other reaction(s): Cough    Losartan Cough     Other reaction(s): Cough       ROS:   Constitutional:  Denies fatigue, tiredness.    Eyes:  Denies change in visual acuity   HENT:  Denies nasal congestion or sore throat   Respiratory: denies cough, shortness of breath.   Cardiovascular:  denies chest pain, edema   GI:  Denies abdominal pain, nausea, vomiting.   Musculoskeletal:  Denies back pain or joint pain   Integument:  Denies dry skin and rash   Neurologic:  Denies headache, focal weakness or sensory changes   Endocrine:  Denies polyuria or polydipsia   Psychiatric:  Denies depression or anxiety      Vitals:    06/24/25 1333   BP: 125/75   Pulse: 74   SpO2: 93%       Body mass index is 24.69 kg/m².     Physical Exam:  GEN: NAD, conversant  CV: RRR,   LUNG: CTA  PSYCH: AOX3, appropriate mood, affect normal      Results Review:     I reviewed the patient's new clinical results.    MicroAlbumin, Urine, Random - (06/18/2025 10:36)    COMPREHENSIVE METABOLIC PANEL (06/18/2025 10:36)    T4, FREE (06/18/2025 10:36)    TSH (06/18/2025 10:36)    HEMOGLOBIN A1C (06/18/2025 10:36)    LIPID PANEL (06/18/2025 10:36)    TSH+Free T4 (06/18/2025 12:34)    Microalbumin / Creatinine Urine Ratio - Urine, Clean Catch (06/19/2025 05:46)      Dexcom download interpretation: Dates cover was between June 11, 2025 to June 24, 2025.  Average blood sugar is 206.  Spending about 42% in the range and 58% above range.  Glucose graph did not show  significant fluctuations.    Medication Review: Reviewed.       Current Outpatient Medications:     acetaminophen (TYLENOL) 650 MG 8 hr tablet, Take 1 tablet by mouth Daily As Needed for Mild Pain, Moderate Pain or Headache., Disp: , Rfl:     aluminum-magnesium hydroxide-simethicone (MAALOX/MYLANTA) 200-200-20 MG/5ML suspension, Take 10 mL by mouth., Disp: , Rfl:     aspirin 81 MG EC tablet, Take 1 tablet by mouth Daily., Disp: , Rfl:     Blood Glucose Monitoring Suppl (ONE TOUCH ULTRA 2) w/Device kit, ONETOUCH ULTRA 2 w/Device KIT, Disp: , Rfl:     calcium carbonate (OS-MARIALUISA) 600 MG tablet, Take 500 mg by mouth Daily., Disp: , Rfl:     carboxymethylcellulose (REFRESH PLUS) 0.5 % solution, Apply 1 drop to eye(s) as directed by provider., Disp: , Rfl:     DULoxetine (CYMBALTA) 30 MG capsule, Take 1 capsule by mouth Daily., Disp: , Rfl:     esomeprazole (nexIUM) 40 MG capsule, Take 1 capsule by mouth Daily., Disp: , Rfl:     famotidine (PEPCID) 20 MG tablet, , Disp: , Rfl:     FeroSul 325 (65 Fe) MG tablet, Take 1 tablet by mouth Daily With Breakfast., Disp: , Rfl:     fluticasone (FLONASE) 50 MCG/ACT nasal spray, , Disp: , Rfl:     gabapentin (NEURONTIN) 100 MG capsule, Take 1 capsule by mouth Daily., Disp: , Rfl:     glucagon (GLUCAGEN) 1 MG injection, Inject 1 mg under the skin into the appropriate area as directed 1 (One) Time As Needed (Hypoglycemia) for up to 2 doses., Disp: 1 each, Rfl: 1    glucose blood test strip, ONETOUCH ULTRA BLUE STRP, Disp: , Rfl:     guaiFENesin (MUCINEX) 600 MG 12 hr tablet, Take 1 tablet by mouth 2 (Two) Times a Day As Needed for Cough., Disp: 30 tablet, Rfl: 0    hydroxychloroquine (PLAQUENIL) 200 MG tablet, Take 1 tablet by mouth 2 (Two) Times a Day., Disp: , Rfl:     Insulin Lispro (HumaLOG) 100 UNIT/ML injection, For use an insulin pump.  Max daily dose is 100 units/day. DX E10.65, Disp: 30 mL, Rfl: 6    Lancets (onetouch ultrasoft) lancets, ONETOUCH ULTRASOFT LANCETS, Disp: ,  Rfl:     levothyroxine (SYNTHROID, LEVOTHROID) 150 MCG tablet, TAKE 1 TABLET BY MOUTH DAILY, Disp: 90 tablet, Rfl: 0    lidocaine (LIDODERM) 5 %, USE 1 PATCH EXTERNALLY ONCE DAILY, Disp: , Rfl:     Magnesium Oxide -Mg Supplement 400 (240 Mg) MG tablet, , Disp: , Rfl:     multivitamin with minerals (PRESERVISION AREDS PO), Take 1 tablet by mouth Daily., Disp: , Rfl:     mupirocin (BACTROBAN) 2 % ointment, Apply  topically to the appropriate area as directed See Admin Instructions. Apply topically to the affected area twice daily, Disp: , Rfl:     NIFEdipine XL (PROCARDIA XL) 30 MG 24 hr tablet, Take 1 tablet by mouth 2 (Two) Times a Day., Disp: 180 tablet, Rfl: 3    ondansetron (ZOFRAN) 4 MG tablet, Take 1 tablet by mouth., Disp: , Rfl:     ondansetron ODT (ZOFRAN-ODT) 4 MG disintegrating tablet, , Disp: , Rfl:     polyethylene glycol (MIRALAX) 17 g packet, Take 17 g by mouth Daily As Needed., Disp: , Rfl:     predniSONE (DELTASONE) 5 MG tablet, Take 1 tablet by mouth Daily., Disp: , Rfl:     pyridoxine (VITAMIN B-6) 100 MG tablet tablet, Take 1 tablet by mouth Daily., Disp: , Rfl:     rosuvastatin (CRESTOR) 20 MG tablet, Take 1 tablet by mouth Every Night., Disp: 90 tablet, Rfl: 3    tacrolimus (PROGRAF) 1 MG capsule, Take 1 capsule by mouth 2 (Two) Times a Day. 3 in he am ad 2 in the pm (Patient taking differently: Take 1 capsule by mouth 2 (Two) Times a Day. 3 in he am ad 3 in the pm), Disp: , Rfl:     Wheat Dextrin (Benefiber Drink Mix) pack, Take  by mouth Daily., Disp: , Rfl:     Calcium Carb-Cholecalciferol (calcium 500 mg vitamin D 5 mcg, 200 UT,) 500-5 MG-MCG tablet per tablet, Take 1 tablet by mouth Daily., Disp: , Rfl:       Assessment & Plan   1.  Diabetes mellitus type 1 with Hyperglycemia: U overall doing well with A1c at 6.7%.  Will continue current regimen.  Follow blood sugars.  Recommend to always keep glucose source in case of low blood sugars.      2.  Hypertension: Well-controlled, continue  current management.    3.  Hyperlipidemia: Well-controlled, continue rosuvastatin.    4.  Hypothyroidism: Well-controlled, TSH and free T4 normal.  We will continue current dose of levothyroxine.    5.  End-stage renal disease: SP renal transplant.     6.  Diabetic peripheral neuropathy: She has bilateral bunions, she will benefit from diabetic shoes.    7.  Osteoporosis: Following with a Rheumatologist.       Assessment and plan from March 25, 2025 reviewed and updated.       Jo Mahoney MD FACE.

## 2025-06-24 NOTE — PATIENT INSTRUCTIONS
Continue current meds  Keep glucose source all the time in case of low blood sugars  Labs before f/u